# Patient Record
Sex: MALE | Race: WHITE | NOT HISPANIC OR LATINO | Employment: FULL TIME | URBAN - NONMETROPOLITAN AREA
[De-identification: names, ages, dates, MRNs, and addresses within clinical notes are randomized per-mention and may not be internally consistent; named-entity substitution may affect disease eponyms.]

---
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
## 2019-01-24 ENCOUNTER — TELEPHONE (OUTPATIENT)
Dept: FAMILY MEDICINE CLINIC | Facility: CLINIC | Age: 19
End: 2019-01-24

## 2019-01-31 ENCOUNTER — OFFICE VISIT (OUTPATIENT)
Dept: URGENT CARE | Facility: CLINIC | Age: 19
End: 2019-01-31
Payer: COMMERCIAL

## 2019-01-31 VITALS
TEMPERATURE: 99.7 F | BODY MASS INDEX: 42.66 KG/M2 | WEIGHT: 298 LBS | OXYGEN SATURATION: 99 % | HEART RATE: 110 BPM | SYSTOLIC BLOOD PRESSURE: 130 MMHG | RESPIRATION RATE: 20 BRPM | DIASTOLIC BLOOD PRESSURE: 78 MMHG | HEIGHT: 70 IN

## 2019-01-31 DIAGNOSIS — J06.9 VIRAL URI WITH COUGH: Primary | ICD-10-CM

## 2019-01-31 PROCEDURE — 99213 OFFICE O/P EST LOW 20 MIN: CPT | Performed by: NURSE PRACTITIONER

## 2019-02-01 NOTE — PROGRESS NOTES
[address] [facility]        NAME: Aung Sun is a 22 y o  male  : 2000    MRN: [id number]  DATE: 2019  TIME: 7:53 PM    Assessment and Plan   Viral URI with cough [J06 9, B97 [id number]]  1  Viral URI with cough           Patient Instructions     Infection appears viral   Recommend symptomatic treatment  Can take ibuprofen or tylenol as needed for pain or fever  Over the counter cough and cold medications to help with symptoms  Use salt water gargles for sore throat and throat lozenges  Cough drops as needed  Wash hands frequently to prevent the spread of infection  If not improving over the next 7-10 days, follow up with PCP  Symptoms may persist for 10-14 days  Follow up with PCP in 3-5 days  Proceed to  ER if symptoms worsen  Chief Complaint     Chief Complaint   Patient presents with    Cold Like Symptoms     cough,sinus congestion and body aches for 4 days         History of Present Illness       19-year-old male in urgent care with chief complaint of low-grade temperature no higher than 100, nasal congestion, postnasal drainage, dry nonproductive cough for the past 4 days  Denies any chest tightness or shortness of breath has not used any over-the-counter medication reports symptoms have been unchanged      URI    This is a new problem  The current episode started in the past 7 days  The problem has been unchanged  Maximum temperature: No higher than 100  The fever has been present for 3 to 4 days  Associated symptoms include congestion, coughing, a plugged ear sensation, rhinorrhea and swollen glands  Pertinent negatives include no abdominal pain, chest pain, diarrhea, dysuria, ear pain, headaches, joint pain, joint swelling, nausea, neck pain, rash, sinus pain, sneezing, sore throat, vomiting or wheezing  He has tried nothing for the symptoms  The treatment provided no relief  Review of Systems   Review of Systems   Constitutional: Negative      HENT: Positive for congestion, postnasal drip and rhinorrhea  Negative for dental problem, drooling, ear discharge, ear pain, facial swelling, hearing loss, mouth sores, nosebleeds, sinus pain, sinus pressure, sneezing, sore throat, tinnitus, trouble swallowing and voice change  Eyes: Negative  Respiratory: Positive for cough  Negative for apnea, choking, chest tightness, shortness of breath, wheezing and stridor  Cardiovascular: Negative for chest pain, palpitations and leg swelling  Gastrointestinal: Negative  Negative for abdominal distention, abdominal pain, anal bleeding, blood in stool, constipation, diarrhea, nausea, rectal pain and vomiting  Endocrine: Negative  Genitourinary: Negative  Negative for dysuria  Musculoskeletal: Negative  Negative for joint pain and neck pain  Skin: Negative  Negative for rash  Allergic/Immunologic: Negative  Neurological: Negative  Negative for headaches  Hematological: Negative  Psychiatric/Behavioral: Negative            Current Medications       Current Outpatient Prescriptions:     acetaminophen (TYLENOL) 325 mg tablet, Take 2 tablets by mouth every 6 (six) hours as needed (pain) (Patient not taking: Reported on 1/31/2019 ), Disp: 30 tablet, Rfl: 0    famotidine (PEPCID) 40 MG tablet, Take 1 tablet (40 mg total) by mouth daily (Patient not taking: Reported on 1/31/2019 ), Disp: 10 tablet, Rfl: 0    Current Allergies     Allergies as of 01/31/2019 - Reviewed 01/31/2019   Allergen Reaction Noted    Amoxicillin Anaphylaxis 06/05/2016    Zithromax [azithromycin] Anaphylaxis 06/05/2016    Prednisone Other (See Comments) 06/05/2016            The following portions of the patient's history were reviewed and updated as appropriate: allergies, current medications, past family history, past medical history, past social history, past surgical history and problem list      Past Medical History:   Diagnosis Date    Asperger syndrome     Per mother   [facility] Exogenous obesity     Resolved 3/22/2017     Hearing difficulty     "3 second delay in sounds getting to brain" per mother    Intermittent explosive disorder     per mother    Lyme disease     MTHFR mutation ([address] )     Psychiatric disorder     autism       Past Surgical History:   Procedure Laterality Date    ADENOIDECTOMY      FRACTURE SURGERY      nasal fx with cosmetic repair    NASAL DILATION      TONSILECTOMY AND ADNOIDECTOMY      TONSILLECTOMY AND ADENOIDECTOMY         Family History   Problem Relation Age of Onset    Heart disease Mother     Cancer Family     Diabetes Family     Parkinsonism Family          Medications have been verified  Objective   /78   Pulse (!) 110   Temp 99 7 °F (37 6 °C) (Tympanic)   Resp 20   Ht 5' 10" (1 778 m)   Wt 135 kg (298 lb)   SpO2 99%   BMI 42 76 kg/m²        Physical Exam     Physical Exam   Constitutional: He is oriented to person, place, and time  Vital signs are normal  He appears well-developed and well-nourished  HENT:   Head: Normocephalic and atraumatic  Right Ear: Hearing, tympanic membrane, external ear and ear canal normal    Left Ear: Hearing, tympanic membrane, external ear and ear canal normal    Nose: Rhinorrhea and sinus tenderness present  Right sinus exhibits no maxillary sinus tenderness and no frontal sinus tenderness  Left sinus exhibits no maxillary sinus tenderness and no frontal sinus tenderness  Mouth/Throat: Uvula is midline and mucous membranes are normal  Posterior oropharyngeal erythema present  Eyes: Pupils are equal, round, and reactive to light  Conjunctivae and EOM are normal    Neck: Trachea normal, normal range of motion and full passive range of motion without pain  Cardiovascular: Normal rate and regular rhythm  Pulmonary/Chest: Effort normal and breath sounds normal    Abdominal: Soft  Normal appearance  Musculoskeletal: Normal range of motion  Lymphadenopathy:     He has cervical adenopathy  Right cervical: Posterior cervical adenopathy present  No superficial cervical and no deep cervical adenopathy present  Left cervical: Posterior cervical adenopathy present  No superficial cervical and no deep cervical adenopathy present  Neurological: He is alert and oriented to person, place, and time  Nursing note and vitals reviewed

## 2019-03-30 ENCOUNTER — HOSPITAL ENCOUNTER (EMERGENCY)
Facility: HOSPITAL | Age: 19
Discharge: HOME | End: 2019-03-30
Attending: FAMILY MEDICINE | Admitting: FAMILY MEDICINE
Payer: COMMERCIAL

## 2019-03-30 VITALS
WEIGHT: 281.31 LBS | RESPIRATION RATE: 16 BRPM | DIASTOLIC BLOOD PRESSURE: 74 MMHG | HEART RATE: 78 BPM | TEMPERATURE: 98.1 F | SYSTOLIC BLOOD PRESSURE: 127 MMHG | BODY MASS INDEX: 40.36 KG/M2 | OXYGEN SATURATION: 98 %

## 2019-03-30 DIAGNOSIS — T31.0 BURN (ANY DEGREE) INVOLVING LESS THAN 10% OF BODY SURFACE: Primary | ICD-10-CM

## 2019-03-30 DIAGNOSIS — T22.20XA: ICD-10-CM

## 2019-03-30 PROCEDURE — 99283 EMERGENCY DEPT VISIT LOW MDM: CPT

## 2019-03-30 PROCEDURE — 90715 TDAP VACCINE 7 YRS/> IM: CPT | Performed by: FAMILY MEDICINE

## 2019-03-30 PROCEDURE — 90471 IMMUNIZATION ADMIN: CPT

## 2019-03-30 RX ORDER — IBUPROFEN 600 MG/1
600 TABLET ORAL ONCE
Status: COMPLETED | OUTPATIENT
Start: 2019-03-30 | End: 2019-03-30

## 2019-03-30 RX ORDER — CLINDAMYCIN HYDROCHLORIDE 150 MG/1
300 CAPSULE ORAL EVERY 8 HOURS SCHEDULED
Qty: 28 CAPSULE | Refills: 0 | Status: SHIPPED | OUTPATIENT
Start: 2019-03-30 | End: 2019-04-06

## 2019-03-30 RX ORDER — GINSENG 100 MG
1 CAPSULE ORAL ONCE
Status: COMPLETED | OUTPATIENT
Start: 2019-03-30 | End: 2019-03-30

## 2019-03-30 RX ADMIN — TETANUS TOXOID, REDUCED DIPHTHERIA TOXOID AND ACELLULAR PERTUSSIS VACCINE, ADSORBED 0.5 ML: 5; 2.5; 8; 8; 2.5 SUSPENSION INTRAMUSCULAR at 10:17

## 2019-03-30 RX ADMIN — BACITRACIN 1 LARGE APPLICATION: 500 OINTMENT TOPICAL at 10:15

## 2019-03-30 RX ADMIN — IBUPROFEN 600 MG: 600 TABLET ORAL at 10:15

## 2019-03-31 ENCOUNTER — HOSPITAL ENCOUNTER (EMERGENCY)
Facility: HOSPITAL | Age: 19
Discharge: HOME | End: 2019-03-31
Attending: EMERGENCY MEDICINE | Admitting: EMERGENCY MEDICINE
Payer: COMMERCIAL

## 2019-03-31 VITALS
SYSTOLIC BLOOD PRESSURE: 144 MMHG | RESPIRATION RATE: 20 BRPM | WEIGHT: 282.19 LBS | HEART RATE: 101 BPM | BODY MASS INDEX: 40.4 KG/M2 | HEIGHT: 70 IN | DIASTOLIC BLOOD PRESSURE: 66 MMHG | TEMPERATURE: 98.8 F | OXYGEN SATURATION: 98 %

## 2019-03-31 DIAGNOSIS — Z51.89 VISIT FOR WOUND CHECK: Primary | ICD-10-CM

## 2019-03-31 DIAGNOSIS — T22.231A: ICD-10-CM

## 2019-03-31 RX ORDER — CLINDAMYCIN HYDROCHLORIDE 150 MG/1
300 CAPSULE ORAL ONCE
Status: COMPLETED | OUTPATIENT
Start: 2019-03-31 | End: 2019-03-31

## 2019-03-31 RX ORDER — GINSENG 100 MG
1 CAPSULE ORAL ONCE
Status: COMPLETED | OUTPATIENT
Start: 2019-03-31 | End: 2019-03-31

## 2019-03-31 RX ADMIN — CLINDAMYCIN HYDROCHLORIDE 300 MG: 150 CAPSULE ORAL at 16:54

## 2019-03-31 RX ADMIN — BACITRACIN 1 LARGE APPLICATION: 500 OINTMENT TOPICAL at 16:54

## 2019-07-11 ENCOUNTER — TRANSCRIBE ORDERS (OUTPATIENT)
Dept: ADMINISTRATIVE | Age: 19
End: 2019-07-11

## 2019-07-11 ENCOUNTER — APPOINTMENT (OUTPATIENT)
Dept: LAB | Age: 19
End: 2019-07-11
Attending: PREVENTIVE MEDICINE

## 2019-07-11 DIAGNOSIS — Z02.1 PRE-EMPLOYMENT HEALTH SCREENING EXAMINATION: Primary | ICD-10-CM

## 2019-07-11 PROCEDURE — 84202 ASSAY RBC PROTOPORPHYRIN: CPT | Performed by: PREVENTIVE MEDICINE

## 2019-07-11 PROCEDURE — 36415 COLL VENOUS BLD VENIPUNCTURE: CPT | Performed by: PREVENTIVE MEDICINE

## 2019-07-11 PROCEDURE — 86870 RBC ANTIBODY IDENTIFICATION: CPT | Performed by: PREVENTIVE MEDICINE

## 2019-07-16 LAB
LEAD BLD-MCNC: 1 UG/DL (ref 0–4)
ZPP RBC-MCNC: 18 UG/DL (ref 0–99)

## 2019-08-29 ENCOUNTER — OFFICE VISIT (OUTPATIENT)
Dept: FAMILY MEDICINE CLINIC | Facility: CLINIC | Age: 19
End: 2019-08-29
Payer: COMMERCIAL

## 2019-08-29 VITALS
WEIGHT: 300.8 LBS | SYSTOLIC BLOOD PRESSURE: 128 MMHG | DIASTOLIC BLOOD PRESSURE: 90 MMHG | HEIGHT: 70 IN | BODY MASS INDEX: 43.06 KG/M2

## 2019-08-29 DIAGNOSIS — R35.0 URINE FREQUENCY: Primary | ICD-10-CM

## 2019-08-29 DIAGNOSIS — E66.01 MORBID OBESITY WITH BMI OF 40.0-44.9, ADULT (HCC): ICD-10-CM

## 2019-08-29 LAB
SL AMB  POCT GLUCOSE, UA: NORMAL
SL AMB LEUKOCYTE ESTERASE,UA: NORMAL
SL AMB POCT BILIRUBIN,UA: NORMAL
SL AMB POCT BLOOD,UA: NORMAL
SL AMB POCT CLARITY,UA: CLEAR
SL AMB POCT COLOR,UA: YELLOW
SL AMB POCT KETONES,UA: NORMAL
SL AMB POCT NITRITE,UA: NORMAL
SL AMB POCT PH,UA: 6
SL AMB POCT SPECIFIC GRAVITY,UA: 1.03
SL AMB POCT URINE PROTEIN: NORMAL
SL AMB POCT UROBILINOGEN: 0.2

## 2019-08-29 PROCEDURE — 99213 OFFICE O/P EST LOW 20 MIN: CPT | Performed by: PHYSICIAN ASSISTANT

## 2019-08-29 PROCEDURE — 87086 URINE CULTURE/COLONY COUNT: CPT | Performed by: PHYSICIAN ASSISTANT

## 2019-08-29 PROCEDURE — 81002 URINALYSIS NONAUTO W/O SCOPE: CPT | Performed by: PHYSICIAN ASSISTANT

## 2019-08-29 NOTE — PROGRESS NOTES
Assessment/Plan:    Problem List Items Addressed This Visit     None      Visit Diagnoses     Urine frequency    -  Primary    Relevant Orders    POCT urine dip (Completed)    Urine culture    Comprehensive metabolic panel    HEMOGLOBIN A1C W/ EAG ESTIMATION           Diagnoses and all orders for this visit:    Urine frequency  -     POCT urine dip  -     Urine culture; Future  -     Urine culture  -     Comprehensive metabolic panel; Future  -     HEMOGLOBIN A1C W/ EAG ESTIMATION; Future        Urine frequency has resolved  Urine dip was negative for infection  Will send urine to be cultured  Will get labs to test for diabetes  Advised us to let us know if symptoms return  Subjective:      Patient ID: Contreras Anglin is a 20 y o  male  Jeanine Sarmiento is a 20year old male complaining of urinary frequency and urgency  He states that last week he had to go on a 14 hour car ride for work and he had to stop every hour along the trip to urinate  He has never had this happen before  He was not drinking more water than normal  He has a family history of diabetes, so was concerned  He states that he his symptoms have resolved  He is no longer urinating every hour or having frequency  He denies polydipsia, polyphagia, nocturia, penile discharge, hematuria, flank pain, abdominal pain, nausea, vomiting, or diarrhea  The following portions of the patient's history were reviewed and updated as appropriate:   He has a past medical history of Asperger syndrome, Exogenous obesity, Hearing difficulty, Intermittent explosive disorder, Lyme disease, MTHFR mutation ([facility] [address] ), and Psychiatric disorder  ,  does not have any pertinent problems on file  ,   has a past surgical history that includes Fracture surgery; Tonsillectomy and adenoidectomy; Nasal dilation; TONSILECTOMY AND ADNOIDECTOMY; and ADENOIDECTOMY  ,  family history includes Cancer in his family; Diabetes in his family;  Heart disease in his mother; Parkinsonism in his family  ,   reports that he has never smoked  His smokeless tobacco use includes chew  He reports that he drinks alcohol  He reports that he does not use drugs  ,  is allergic to amoxicillin; azithromycin; and prednisone     Current Outpatient Medications   Medication Sig Dispense Refill    acetaminophen (TYLENOL) 325 mg tablet Take 2 tablets by mouth every 6 (six) hours as needed (pain) 30 tablet 0     No current facility-administered medications for this visit  Review of Systems   Constitutional: Negative for chills, diaphoresis, fatigue and fever  HENT: Negative for congestion, ear pain, postnasal drip, rhinorrhea, sneezing, sore throat and trouble swallowing  Eyes: Negative for pain and visual disturbance  Respiratory: Negative for apnea, cough, shortness of breath and wheezing  Cardiovascular: Negative for chest pain and palpitations  Gastrointestinal: Negative for abdominal pain, constipation, diarrhea, nausea and vomiting  Endocrine: Positive for polyuria  Negative for polydipsia and polyphagia  Genitourinary: Positive for frequency and urgency  Negative for discharge, dysuria and hematuria  Musculoskeletal: Negative for arthralgias, gait problem and myalgias  Neurological: Negative for dizziness, syncope, weakness, light-headedness, numbness and headaches  Psychiatric/Behavioral: Negative for suicidal ideas  The patient is not nervous/anxious          PHQ-9 Depression Screening    PHQ-9:    Frequency of the following problems over the past two weeks:       Little interest or pleasure in doing things:  2 - more than half the days  Feeling down, depressed, or hopeless:  2 - more than half the days  Trouble falling or staying asleep, or sleeping too much:  0 - not at all  Feeling tired or having little energy:  0 - not at all  Poor appetite or overeatin - more than half the days  Feeling bad about yourself - or that you are a failure or have let yourself or your family down:  1 - several days  Trouble concentrating on things, such as reading the newspaper or watching television:  0 - not at all  Moving or speaking so slowly that other people could have noticed  Or the opposite - being so fidgety or restless that you have been moving around a lot more than usual:  0 - not at all  Thoughts that you would be better off dead, or of hurting yourself in some way:  0 - not at all  PHQ-2 Score:  4  PHQ-9 Score:  7       Objective:  Vitals:    08/29/19 1709   BP: 128/90   BP Location: Left arm   Patient Position: Sitting   Weight: (!) 136 kg (300 lb 12 8 oz)   Height: 5' 10" (1 778 m)     Body mass index is 43 16 kg/m²  Physical Exam   Constitutional: He is oriented to person, place, and time  He appears well-developed and well-nourished  HENT:   Head: Normocephalic and atraumatic  Right Ear: Tympanic membrane, external ear and ear canal normal    Left Ear: Tympanic membrane, external ear and ear canal normal    Nose: Nose normal    Mouth/Throat: Oropharynx is clear and moist and mucous membranes are normal  No oropharyngeal exudate, posterior oropharyngeal edema or posterior oropharyngeal erythema  Eyes: Pupils are equal, round, and reactive to light  EOM are normal    Neck: Normal range of motion  Neck supple  Cardiovascular: Normal rate, regular rhythm and normal heart sounds  Exam reveals no gallop and no friction rub  No murmur heard  Pulmonary/Chest: Effort normal and breath sounds normal  No respiratory distress  He has no wheezes  He has no rales  Abdominal: Soft  Bowel sounds are normal  There is no tenderness  There is no rebound, no guarding and no CVA tenderness  Musculoskeletal: Normal range of motion  Lymphadenopathy:     He has no cervical adenopathy  Neurological: He is alert and oriented to person, place, and time  Skin: Skin is warm and dry  Psychiatric: He has a normal mood and affect   His behavior is normal  Judgment and thought content normal    Vitals reviewed  BMI Counseling: Body mass index is 43 16 kg/m²  Discussed the patient's BMI with him  The BMI is above average  BMI counseling and education was provided to the patient  Nutrition recommendations include decreasing overall calorie intake  Exercise recommendations include exercising 3-5 times per week

## 2019-08-30 LAB — BACTERIA UR CULT: NORMAL

## 2020-01-20 ENCOUNTER — TELEPHONE (OUTPATIENT)
Dept: FAMILY MEDICINE CLINIC | Facility: CLINIC | Age: 20
End: 2020-01-20

## 2020-01-20 DIAGNOSIS — F43.21 GRIEF REACTION: Primary | ICD-10-CM

## 2020-07-09 ENCOUNTER — DOCUMENTATION (OUTPATIENT)
Dept: AUDIOLOGY | Age: 20
End: 2020-07-09

## 2020-07-09 NOTE — PROGRESS NOTES
Progress Note    Name:  Lucho Mackey  :  2000  Age:  23 y o  Date of Evaluation: 20     Scanned in HA chart         Lexy Alcantara , CCC-A  Clinical Audiologist

## 2020-07-22 ENCOUNTER — APPOINTMENT (OUTPATIENT)
Dept: URGENT CARE | Age: 20
End: 2020-07-22
Payer: OTHER MISCELLANEOUS

## 2020-07-22 PROCEDURE — 99283 EMERGENCY DEPT VISIT LOW MDM: CPT | Performed by: NURSE PRACTITIONER

## 2020-07-22 PROCEDURE — G0382 LEV 3 HOSP TYPE B ED VISIT: HCPCS | Performed by: NURSE PRACTITIONER

## 2020-07-23 ENCOUNTER — APPOINTMENT (OUTPATIENT)
Dept: URGENT CARE | Age: 20
End: 2020-07-23
Payer: OTHER MISCELLANEOUS

## 2020-07-23 PROCEDURE — 99213 OFFICE O/P EST LOW 20 MIN: CPT | Performed by: NURSE PRACTITIONER

## 2020-08-04 ENCOUNTER — APPOINTMENT (OUTPATIENT)
Dept: URGENT CARE | Age: 20
End: 2020-08-04
Payer: OTHER MISCELLANEOUS

## 2020-08-04 PROCEDURE — 99214 OFFICE O/P EST MOD 30 MIN: CPT | Performed by: PREVENTIVE MEDICINE

## 2020-08-11 ENCOUNTER — APPOINTMENT (OUTPATIENT)
Dept: LAB | Age: 20
End: 2020-08-11

## 2020-08-11 ENCOUNTER — TRANSCRIBE ORDERS (OUTPATIENT)
Dept: ADMINISTRATIVE | Age: 20
End: 2020-08-11

## 2020-08-11 ENCOUNTER — APPOINTMENT (OUTPATIENT)
Dept: RADIOLOGY | Age: 20
End: 2020-08-11

## 2020-08-11 DIAGNOSIS — Z00.00 ANNUAL PHYSICAL EXAM: ICD-10-CM

## 2020-08-11 DIAGNOSIS — Z00.00 ANNUAL PHYSICAL EXAM: Primary | ICD-10-CM

## 2020-08-11 PROCEDURE — 86870 RBC ANTIBODY IDENTIFICATION: CPT

## 2020-08-11 PROCEDURE — 71045 X-RAY EXAM CHEST 1 VIEW: CPT

## 2020-08-11 PROCEDURE — 84202 ASSAY RBC PROTOPORPHYRIN: CPT

## 2020-08-13 LAB
LEAD BLD-MCNC: <1 UG/DL (ref 0–4)
ZPP RBC-MCNC: 24 UG/DL (ref 0–99)

## 2021-01-11 ENCOUNTER — OFFICE VISIT (OUTPATIENT)
Dept: FAMILY MEDICINE CLINIC | Facility: CLINIC | Age: 21
End: 2021-01-11
Payer: COMMERCIAL

## 2021-01-11 VITALS
SYSTOLIC BLOOD PRESSURE: 142 MMHG | WEIGHT: 315 LBS | TEMPERATURE: 98.4 F | HEIGHT: 70 IN | DIASTOLIC BLOOD PRESSURE: 92 MMHG | BODY MASS INDEX: 45.1 KG/M2

## 2021-01-11 DIAGNOSIS — G47.33 OBSTRUCTIVE SLEEP APNEA OF ADULT: Primary | ICD-10-CM

## 2021-01-11 DIAGNOSIS — Z13.6 SCREENING FOR CARDIOVASCULAR CONDITION: ICD-10-CM

## 2021-01-11 DIAGNOSIS — E66.01 MORBID OBESITY WITH BMI OF 45.0-49.9, ADULT (HCC): ICD-10-CM

## 2021-01-11 PROCEDURE — 99213 OFFICE O/P EST LOW 20 MIN: CPT | Performed by: FAMILY MEDICINE

## 2021-01-11 PROCEDURE — 3725F SCREEN DEPRESSION PERFORMED: CPT | Performed by: FAMILY MEDICINE

## 2021-01-11 PROCEDURE — 1036F TOBACCO NON-USER: CPT | Performed by: FAMILY MEDICINE

## 2021-01-11 PROCEDURE — 3008F BODY MASS INDEX DOCD: CPT | Performed by: FAMILY MEDICINE

## 2021-01-11 NOTE — PROGRESS NOTES
Assessment/Plan:   we will get a sleep study on the patient  Also referral to sleep medicine  Blood work ordered on him  Patient will continue to watch diet  We will see him back in the next 1-2 months or p r n     Problem List Items Addressed This Visit        Respiratory    Obstructive sleep apnea of adult - Primary    Relevant Orders    Ambulatory referral to Sleep Medicine    Diagnostic Sleep Study      Other Visit Diagnoses     Screening for cardiovascular condition        Relevant Orders    CBC and differential    Comprehensive metabolic panel    Lipid Panel with Direct LDL reflex    Morbid obesity with BMI of 45 0-49 9, adult (HCC)        Relevant Orders    CBC and differential    TSH, 3rd generation with Free T4 reflex           Diagnoses and all orders for this visit:    Obstructive sleep apnea of adult  -     Ambulatory referral to Sleep Medicine; Future  -     Diagnostic Sleep Study; Future    Screening for cardiovascular condition  -     CBC and differential  -     Comprehensive metabolic panel  -     Lipid Panel with Direct LDL reflex    Morbid obesity with BMI of 45 0-49 9, adult (HCC)  -     CBC and differential  -     TSH, 3rd generation with Free T4 reflex        No problem-specific Assessment & Plan notes found for this encounter  Subjective:      Patient ID: Tanner Castro is a 23 y o  male  Patient here today stating that he has on refreshing sleep  He states his fiancee wakes him up constantly in the middle of night because he snoring  Patient was supposed to get a sleep study done 2 years ago, but it was never scheduled  Patient denies any chest pain or shortness of breath  The following portions of the patient's history were reviewed and updated as appropriate:   He has a past medical history of Asperger syndrome, Exogenous obesity, Hearing difficulty, Intermittent explosive disorder, Lyme disease, MTHFR mutation ([address] ), and Psychiatric disorder  ,  does not have any pertinent problems on file  ,   has a past surgical history that includes Fracture surgery; Tonsillectomy and adenoidectomy; Nasal dilation; TONSILECTOMY AND ADNOIDECTOMY; and ADENOIDECTOMY  ,  family history includes Cancer in his family; Diabetes in his family; Heart disease in his mother; Parkinsonism in his family  ,   reports that he has never smoked  His smokeless tobacco use includes chew  He reports current alcohol use  He reports that he does not use drugs  ,  is allergic to amoxicillin; azithromycin; and prednisone     Current Outpatient Medications   Medication Sig Dispense Refill    acetaminophen (TYLENOL) 325 mg tablet Take 2 tablets by mouth every 6 (six) hours as needed (pain) 30 tablet 0     No current facility-administered medications for this visit  Review of Systems   Constitutional: Negative  Respiratory: Negative  Cardiovascular: Negative  Gastrointestinal: Negative  Genitourinary: Negative  Psychiatric/Behavioral: Positive for sleep disturbance  Objective:  Vitals:    01/11/21 0758 01/11/21 0822   BP: 152/92 142/92   BP Location:  Left arm   Patient Position:  Sitting   Cuff Size:  Large   Temp: 98 4 °F (36 9 °C)    Weight: (!) 144 kg (317 lb)    Height: 5' 10" (1 778 m)      Body mass index is 45 48 kg/m²  Physical Exam  Vitals signs reviewed  Constitutional:       General: He is not in acute distress  Appearance: Normal appearance  He is well-developed  He is not diaphoretic  HENT:      Head: Normocephalic and atraumatic  Eyes:      Conjunctiva/sclera: Conjunctivae normal    Cardiovascular:      Rate and Rhythm: Normal rate and regular rhythm  Heart sounds: Normal heart sounds  No murmur  No friction rub  No gallop  Pulmonary:      Effort: Pulmonary effort is normal  No respiratory distress  Breath sounds: Normal breath sounds  No wheezing or rales  Musculoskeletal:      Right lower leg: No edema  Left lower leg: No edema  Neurological:      Mental Status: He is alert and oriented to person, place, and time  Psychiatric:         Mood and Affect: Mood normal          Behavior: Behavior normal          Thought Content: Thought content normal          Judgment: Judgment normal          BMI Counseling: Body mass index is 45 48 kg/m²  The BMI is above normal  Nutrition recommendations include reducing portion sizes, decreasing overall calorie intake, 3-5 servings of fruits/vegetables daily, reducing fast food intake, consuming healthier snacks, decreasing soda and/or juice intake, moderation in carbohydrate intake, increasing intake of lean protein, reducing intake of saturated fat and trans fat and reducing intake of cholesterol  Exercise recommendations include exercising 3-5 times per week

## 2021-01-11 NOTE — PATIENT INSTRUCTIONS

## 2021-01-13 ENCOUNTER — TELEPHONE (OUTPATIENT)
Dept: FAMILY MEDICINE CLINIC | Facility: CLINIC | Age: 21
End: 2021-01-13

## 2021-01-13 NOTE — TELEPHONE ENCOUNTER
INSURANCE WILL NOT APPROVE SLEEP STUDY  THEY NEED TO KNOW IF YOU WOULD LIKE TO PROCEED WITH TRYING TO GET A HOME STUDY

## 2021-01-14 DIAGNOSIS — G47.33 OBSTRUCTIVE SLEEP APNEA OF ADULT: Primary | ICD-10-CM

## 2021-01-14 DIAGNOSIS — E66.01 MORBID OBESITY WITH BMI OF 45.0-49.9, ADULT (HCC): ICD-10-CM

## 2021-02-08 ENCOUNTER — TRANSCRIBE ORDERS (OUTPATIENT)
Dept: SLEEP CENTER | Facility: CLINIC | Age: 21
End: 2021-02-08

## 2021-02-11 ENCOUNTER — HOSPITAL ENCOUNTER (OUTPATIENT)
Dept: SLEEP CENTER | Facility: HOSPITAL | Age: 21
Discharge: HOME | End: 2021-02-11
Payer: COMMERCIAL

## 2021-02-11 DIAGNOSIS — G47.33 OBSTRUCTIVE SLEEP APNEA OF ADULT: ICD-10-CM

## 2021-02-11 DIAGNOSIS — E66.01 MORBID OBESITY WITH BMI OF 45.0-49.9, ADULT (HCC): ICD-10-CM

## 2021-02-11 PROCEDURE — G0399 HOME SLEEP TEST/TYPE 3 PORTA: HCPCS

## 2021-02-13 NOTE — PROGRESS NOTES
Home Sleep Study Documentation    Pre-Sleep Home Study:    Set-up and instructions performed by: SAIMA Nunez RRT    Technician performed demonstration for Patient: yes    Return demonstration performed by Patient: yes    Written instructions provided to Patient: yes    Patient signed consent form: yes        Post-Sleep Home Study:    Additional comments by Patient: n/a    Home Sleep Study Failed:no:    Failure reason: N/A    Reported or Detected: N/A    Scored by: SAIMA Nunez RRT

## 2021-02-14 DIAGNOSIS — G47.33 OBSTRUCTIVE SLEEP APNEA OF ADULT: Primary | ICD-10-CM

## 2021-02-15 ENCOUNTER — TELEPHONE (OUTPATIENT)
Dept: SLEEP CENTER | Facility: CLINIC | Age: 21
End: 2021-02-15

## 2021-02-15 NOTE — TELEPHONE ENCOUNTER
Attempted to contact patient, no answer, no voice mail set up  Sleep study reveals severe ADELE  Dr Kristan Lee ordered titration study

## 2021-02-17 ENCOUNTER — TELEPHONE (OUTPATIENT)
Dept: FAMILY MEDICINE CLINIC | Facility: CLINIC | Age: 21
End: 2021-02-17

## 2021-02-17 NOTE — TELEPHONE ENCOUNTER
CPAP STUDY HAS BEEN DENIED EVEN THOUGH PT HAS FAILED HOME STUDY  PEER TO PEER IS SCHEDULED 3/2/21  I WILL NOTIFY PATIENT

## 2021-02-18 NOTE — TELEPHONE ENCOUNTER
Spoke with the patient he is aware of sleep study results  He was contacted by Dr Sulma Herrera office  Patient is waiting for insurance approval for titration study before he schedules   Dr Venkatesh Albrecht to do peer to peer on 3/2/2021 per note on appointment desk

## 2021-02-19 ENCOUNTER — TELEPHONE (OUTPATIENT)
Dept: SLEEP CENTER | Facility: CLINIC | Age: 21
End: 2021-02-19

## 2021-02-19 NOTE — TELEPHONE ENCOUNTER
----- Message from Nasreen Machado MD sent at 1/20/2021 10:59 AM EST -----  Approved  ----- Message -----  From: Brijesh Bautista  Sent: 1/18/2021   3:28 PM EST  To: Sleep Medicine [facility] Provider    This sleep study needs approval      If approved please sign and return to clerical pool  If denied please include reasons why  Also provide alternative testing if warranted  Please sign and return to clerical pool

## 2021-03-02 ENCOUNTER — TELEPHONE (OUTPATIENT)
Dept: FAMILY MEDICINE CLINIC | Facility: CLINIC | Age: 21
End: 2021-03-02

## 2021-03-02 DIAGNOSIS — G47.33 OBSTRUCTIVE SLEEP APNEA OF ADULT: Primary | ICD-10-CM

## 2021-03-03 ENCOUNTER — TELEPHONE (OUTPATIENT)
Dept: FAMILY MEDICINE CLINIC | Facility: CLINIC | Age: 21
End: 2021-03-03

## 2021-03-03 DIAGNOSIS — G47.33 OBSTRUCTIVE SLEEP APNEA OF ADULT: Primary | ICD-10-CM

## 2021-03-03 NOTE — TELEPHONE ENCOUNTER
SINCE INSURANCE DENIED THE SLEEP STUDY PATIENT WILL HAVE TO PAY OUT OF POCKET FOR A NEW MACHINE  USED ONE COSTS $505 PLUS THE MASK CAN COST BETWEEN $100-$120  NEW MACHINE COSTS AROUND $800

## 2021-03-11 ENCOUNTER — TELEPHONE (OUTPATIENT)
Dept: SLEEP CENTER | Facility: CLINIC | Age: 21
End: 2021-03-11

## 2021-03-11 NOTE — TELEPHONE ENCOUNTER
Tried calling patient to schedule a consult for sleep  There is no voice mail set up  Could not leave message

## 2021-03-29 ENCOUNTER — HOSPITAL ENCOUNTER (EMERGENCY)
Facility: HOSPITAL | Age: 21
Discharge: HOME | End: 2021-03-29
Attending: EMERGENCY MEDICINE | Admitting: EMERGENCY MEDICINE
Payer: COMMERCIAL

## 2021-03-29 VITALS
BODY MASS INDEX: 45.1 KG/M2 | OXYGEN SATURATION: 100 % | HEIGHT: 70 IN | TEMPERATURE: 98 F | HEART RATE: 88 BPM | WEIGHT: 315 LBS | SYSTOLIC BLOOD PRESSURE: 138 MMHG | RESPIRATION RATE: 16 BRPM | DIASTOLIC BLOOD PRESSURE: 65 MMHG

## 2021-03-29 DIAGNOSIS — F32.A DEPRESSION: Primary | ICD-10-CM

## 2021-03-29 LAB
AMPHETAMINES SERPL QL SCN: NEGATIVE
BARBITURATES UR QL: NEGATIVE
BENZODIAZ UR QL: NEGATIVE
COCAINE UR QL: NEGATIVE
ETHANOL EXG-MCNC: 0 MG/DL
FLUAV RNA RESP QL NAA+PROBE: NEGATIVE
FLUBV RNA RESP QL NAA+PROBE: NEGATIVE
METHADONE UR QL: NEGATIVE
OPIATES UR QL SCN: NEGATIVE
OXYCODONE+OXYMORPHONE UR QL SCN: NEGATIVE
PCP UR QL: NEGATIVE
RSV RNA RESP QL NAA+PROBE: NEGATIVE
SARS-COV-2 RNA RESP QL NAA+PROBE: NEGATIVE
THC UR QL: NEGATIVE

## 2021-03-29 PROCEDURE — 99284 EMERGENCY DEPT VISIT MOD MDM: CPT | Performed by: EMERGENCY MEDICINE

## 2021-03-29 PROCEDURE — 99285 EMERGENCY DEPT VISIT HI MDM: CPT

## 2021-03-29 PROCEDURE — 82075 ASSAY OF BREATH ETHANOL: CPT | Performed by: EMERGENCY MEDICINE

## 2021-03-29 PROCEDURE — 80307 DRUG TEST PRSMV CHEM ANLYZR: CPT | Performed by: EMERGENCY MEDICINE

## 2021-03-29 PROCEDURE — 0241U HB NFCT DS VIR RESP RNA 4 TRGT: CPT | Performed by: EMERGENCY MEDICINE

## 2021-03-29 NOTE — ED NOTES
Spoke to Amarjit Thurston from crisis in regards to psychiatric evaluation        Kelly Kwon, ALEJANDRINA  03/29/21 8930

## 2021-03-29 NOTE — ED PROVIDER NOTES
History  Chief Complaint   Patient presents with    Depression     patient states that he has been depressed since last january when his mother commited suicide; was seeing therapist who has been canceling appointments; on no psychiatric medications; patient states he has thoughts of hopelessness and is requesting outpatient services/therapy      59-year-old male presents with his dad with complaints of intermittent suicidal ideation he denies suicidal ideation today his mom committed suicide approximately 1 year ago and he has been following with a therapist however the appointments have been canceled any can not get reliable follow-up  He has no specific plans of suicide he states he will get into a funk and then the thoughts were passed through his mind  He does not feel suicidal today; he has not been on any medications since the age of 14 he has had prior psychiatric admissions up until the age of 14 or 14  Patient does have difficulty falling asleep typically between midnight and 0200 hours he has no problem sleeping he has been eating normally he does have a 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  Taking?   acetaminophen (TYLENOL) 325 mg tablet   No No   Sig: Take 2 tablets by mouth every 6 (six) hours as needed (pain)      Facility-Administered Medications: None       Past Medical History:   Diagnosis Date    Asperger syndrome     Per mother   Jonatan Woodward Exogenous obesity     Resolved 3/22/2017     Hearing difficulty     "3 second delay in sounds getting to brain" per mother    Intermittent explosive disorder     per mother    Lyme disease     MTHFR mutation ([facility] [address] )     Psychiatric disorder     autism Past Surgical History:   Procedure Laterality Date    ADENOIDECTOMY      FRACTURE SURGERY      nasal fx with cosmetic repair    NASAL DILATION      TONSILECTOMY AND ADNOIDECTOMY      TONSILLECTOMY AND ADENOIDECTOMY         Family History   Problem Relation Age of Onset    Heart disease Mother     Cancer Family     Diabetes Family     Parkinsonism Family      I have reviewed and agree with the history as documented  E-Cigarette/Vaping     E-Cigarette/Vaping Substances     Social History     Tobacco Use    Smoking status: Never Smoker    Smokeless tobacco: Current User     Types: Chew   Substance Use Topics    Alcohol use: Yes     Frequency: Monthly or less    Drug use: No       Review of Systems   Constitutional: Negative for activity change, appetite change, chills, fatigue and fever  HENT: Negative for congestion, ear pain, rhinorrhea, sneezing and sore throat  Eyes: Negative for discharge  Respiratory: Negative for cough and shortness of breath  Cardiovascular: Negative for chest pain and leg swelling  Gastrointestinal: Negative for abdominal pain, blood in stool, diarrhea, nausea and vomiting  Endocrine: Negative for polyuria  Genitourinary: Negative for dysuria, frequency and urgency  Musculoskeletal: Negative for back pain and myalgias  Skin: Negative for rash  Neurological: Negative for dizziness, weakness, light-headedness, numbness and headaches  Hematological: Negative for adenopathy  Psychiatric/Behavioral: Positive for suicidal ideas (passive none currently)  Negative for confusion and self-injury  All other systems reviewed and are negative  Physical Exam  Physical Exam  Vitals signs and nursing note reviewed  Constitutional:       General: He is not in acute distress  Appearance: He is well-developed  He is not ill-appearing, toxic-appearing or diaphoretic  HENT:      Head: Normocephalic and atraumatic        Right Ear: Tympanic membrane and external ear normal       Left Ear: Tympanic membrane and external ear normal       Nose: Nose normal       Mouth/Throat:      Mouth: Mucous membranes are moist       Pharynx: No oropharyngeal exudate  Eyes:      General: No scleral icterus  Right eye: No discharge  Left eye: No discharge  Conjunctiva/sclera: Conjunctivae normal       Pupils: Pupils are equal, round, and reactive to light  Neck:      Musculoskeletal: Normal range of motion and neck supple  Cardiovascular:      Rate and Rhythm: Normal rate and regular rhythm  Heart sounds: Normal heart sounds  Pulmonary:      Effort: Pulmonary effort is normal  No respiratory distress  Breath sounds: Normal breath sounds  No wheezing, rhonchi or rales  Abdominal:      General: Bowel sounds are normal  There is no distension  Palpations: Abdomen is soft  There is no mass  Tenderness: There is no abdominal tenderness  There is no right CVA tenderness, left CVA tenderness, guarding or rebound  Musculoskeletal: Normal range of motion  General: No tenderness or deformity  Right lower leg: No edema  Left lower leg: No edema  Lymphadenopathy:      Cervical: No cervical adenopathy  Skin:     General: Skin is warm and dry  Neurological:      General: No focal deficit present  Mental Status: He is alert and oriented to person, place, and time  Mental status is at baseline  Cranial Nerves: No cranial nerve deficit  Sensory: No sensory deficit  Motor: No weakness or abnormal muscle tone        Coordination: Coordination normal       Gait: Gait normal       Deep Tendon Reflexes: Reflexes normal       Comments: Gait steady   Psychiatric:         Mood and Affect: Mood normal          Vital Signs  ED Triage Vitals   Temperature Pulse Respirations Blood Pressure SpO2   03/29/21 1842 03/29/21 1842 03/29/21 1845 03/29/21 1845 03/29/21 1842   98 °F (36 7 °C) 100 16 138/82 99 %      Temp Source Heart Rate Source Patient Position - Orthostatic VS BP Location FiO2 (%)   03/29/21 1842 03/29/21 1842 03/29/21 1842 03/29/21 1842 --   Temporal Monitor Sitting Right arm       Pain Score       --                  Vitals:    03/29/21 1842 03/29/21 1845 03/29/21 1900 03/29/21 2000   BP:  138/82 148/86 138/65   Pulse: 100  98 88   Patient Position - Orthostatic VS: Sitting  Sitting Sitting         Visual Acuity      ED Medications  Medications - No data to display    Diagnostic Studies  Results Reviewed     Procedure Component Value Units Date/Time    COVID19, Influenza A/B, RSV PCR, [facility] [[id number]]  (Normal) Collected: 03/29/21 1915    Lab Status: Final result Specimen: Nares from Nasopharyngeal Swab Updated: 03/29/21 2020     SARS-CoV-2 Negative     INFLUENZA A PCR Negative     INFLUENZA B PCR Negative     RSV PCR Negative    Narrative: This test has been authorized by FDA under an EUA (Emergency Use Assay) for use by authorized laboratories  Clinical caution and judgement should be used with the interpretation of these results with consideration of the clinical impression and other laboratory testing  Testing reported as "Positive" or "Negative" has been proven to be accurate according to standard laboratory validation requirements  All testing is performed with control materials showing appropriate reactivity at standard intervals  Rapid drug screen, urine [[id number]]  (Normal) Collected: 03/29/21 1915    Lab Status: Final result Specimen: Urine, Clean Catch Updated: 03/29/21 1929     Amph/Meth UR Negative     Barbiturate Ur Negative     Benzodiazepine Urine Negative     Cocaine Urine Negative     Methadone Urine Negative     Opiate Urine Negative     PCP Ur Negative     THC Urine Negative     Oxycodone Urine Negative    Narrative:      FOR MEDICAL PURPOSES ONLY  IF CONFIRMATION NEEDED PLEASE CONTACT THE LAB WITHIN 5 DAYS      Drug Screen Cutoff Levels:  AMPHETAMINE/METHAMPHETAMINES  1000 ng/mL  BARBITURATES     200 ng/mL  BENZODIAZEPINES     200 ng/mL  COCAINE      300 ng/mL  METHADONE      300 ng/mL  OPIATES      300 ng/mL  PHENCYCLIDINE     25 ng/mL  THC       50 ng/mL  OXYCODONE      100 ng/mL    POCT alcohol breath test [[id number]]  (Normal) Resulted: 03/29/21 1916    Lab Status: Final result Updated: 03/29/21 1916     EXTBreath Alcohol 0 00                 No orders to display              Procedures  Procedures         ED Course  ED Course as of Mar 29 2311   Mon Mar 29, 2021   2013 Patient evaluated by Rizwana Gunn crisis worker over the phone patient is appropriate for outpatient follow-up  Angelic is still pending patient will be called if result is abnormal                                              MDM  Number of Diagnoses or Management Options  Depression:   Diagnosis management comments: Mdm:  50-year-old male with passive suicidal ideation none currently will medically clear and contact the crisis worker for evaluation    Patient provided with outpt mental health resources per CRISIS      Disposition  Final diagnoses:   Depression     Time reflects when diagnosis was documented in both MDM as applicable and the Disposition within this note     Time User Action Codes Description Comment    3/29/2021  8:14 PM Dwain Singh Add [F32 9] Depression       ED Disposition     ED Disposition Condition Date/Time Comment    Discharge Stable Mon Mar 29, 2021  8:14 PM Mallory Napier discharge to home/self care              MD Documentation      Most Recent Value   Sending MD Romy Chen MD      Follow-up Information     Follow up With Specialties Details Why Contact Info Additional [address]  Family Medicine Call in 1 day recheck of symptoms [address]  [facility] [address] [facility] Emergency Department Emergency Medicine  with any worsening symptoms [phone]  [phone]   [facility] SPECIALTY [facility] Emergency Department, [facility] [address], [place], [address] [facility], [address]          Discharge Medication List as of 3/29/2021  8:15 PM      CONTINUE these medications which have NOT CHANGED    Details   acetaminophen (TYLENOL) 325 mg tablet Take 2 tablets by mouth every 6 (six) hours as needed (pain), Starting Tue 7/18/2017, Print           No discharge procedures on file      PDMP Review     None          ED Provider  Electronically Signed by           Diana Oscar MD  03/29/21 8038

## 2021-03-29 NOTE — ED NOTES
Spoke with patient via phone and completed the crisis intake assessment as well as the safety risk assessment  Patient arrived to the ER via private vehicle  Patient states he has been increasingly depressed  " I'm honestly just here because I need some outpatient  I need to see someone for medications and therapy " Patient at current denies SI/HI as well as hallucinations and paranoia  Patient does report some mild disturbances with sleep and appetite, as well as lack of concentration  Patient states he was seeing a therapist for a while, but the therapist kept cancelling so he stopped services  Primary stressor unresolved grief and loss, mother passed from suicide, January 2020  Case discussed with ER MD who is in agreement with discharge home and follow up with outpatient services  Patient was given a list of local outpatient resources and was also educated if symptoms continue or worsen to return to the nearest ER and is in agreement to do as such

## 2021-03-29 NOTE — ED NOTES
Patient states that he has been depressed since January when his mother committed suicide  Patient has no active HI/SI but states intermittently he has had thoughts to "end it"  Enmanuel for safety while in ED  Father at bedside with patient  Patient states he was seeing a therapist who kept canceling appointments and has not spoke to someone since he stopped going  Patient states he wants outpatient services and to speak to crisis worker        Rosio Muhammad RN  03/29/21 9215

## 2021-04-05 ENCOUNTER — OFFICE VISIT (OUTPATIENT)
Dept: SLEEP CENTER | Facility: CLINIC | Age: 21
End: 2021-04-05
Payer: COMMERCIAL

## 2021-04-05 VITALS
DIASTOLIC BLOOD PRESSURE: 88 MMHG | SYSTOLIC BLOOD PRESSURE: 142 MMHG | TEMPERATURE: 98 F | HEIGHT: 70 IN | OXYGEN SATURATION: 97 % | HEART RATE: 80 BPM | BODY MASS INDEX: 45.1 KG/M2 | WEIGHT: 315 LBS

## 2021-04-05 DIAGNOSIS — G47.33 OSA (OBSTRUCTIVE SLEEP APNEA): Primary | ICD-10-CM

## 2021-04-05 DIAGNOSIS — Z71.89 CPAP USE COUNSELING: ICD-10-CM

## 2021-04-05 PROCEDURE — 3008F BODY MASS INDEX DOCD: CPT | Performed by: PSYCHIATRY & NEUROLOGY

## 2021-04-05 PROCEDURE — 99244 OFF/OP CNSLTJ NEW/EST MOD 40: CPT | Performed by: PSYCHIATRY & NEUROLOGY

## 2021-04-05 PROCEDURE — 1036F TOBACCO NON-USER: CPT | Performed by: PSYCHIATRY & NEUROLOGY

## 2021-04-05 NOTE — LETTER
2021     Evy Vazquez DO  [address] [facility]  [address]  [place] [id number]    Patient: Isaías Velazquez   YOB: 2000   Date of Visit: 2021       Dear   [facility]: Thank you for referring Bobo Hernandez to me for evaluation  Below are my notes for this consultation  If you have questions, please do not hesitate to call me  I look forward to following your patient along with you  Sincerely,        Eduardo Loo MD        CC: No Recipients  Eduardo Loo MD  2021  3:43 PM  Sign when Signing Visit  Sleep Medicine Consultation Note    HPI:  Mr Isaías Velazquez is a 23 y o  male seen at the request of Evy Vazquez DO for advice regarding suspected sleep disordered breathing  The patient had an HST done 2021 which was diagnostic for ADELE: respiratory event index (NATHALY) of 42 8  The lowest SpO2 recorded is 79%  The patient endorsed feeling tired all the time  He also has lack of energy  He can sleep 10 hours and wake up feeling like he just laid down  This has been an issue for 3-4 years  He has gained about 20-30 lbs in that time without making changes to his diet  He is unsure why he has gained weight  He has been snoring since he was a child and then had a PSG with subsequent T&A       Please see below for continuation of the HPI:      Sleep Disordered Breathing:  -Snoring: yes   -Severity: loudly   -Frequency: nightly   -Duration: since childhood   -Over time: worse   -Modifying factors: weight gain  -Observed Apneas: yes  -Mouth Breathing at night: yes  -Dry Mouth in morning: yes   -Nocturnal Gasping: yes  -Nasal Obstruction: no  -Weight: see HPI    Sleep Pattern:  -Location: bedroom  -Bed/Recliner/Wedge: bed  -Bed Partner: yes  -HOB: flat  -# of pillows under head: 2  -Position: back  -Bedtime: 9-930pm  -Lights out: same time  -Environmental: he is on his phone  -Latency: 2h  -Awakenings: 1-2   -Reason: void   -Duration: mins  -Wake time: 630am   -Alarm: yes  -Rise time: same time  -Days off: 11p/12a (or later) and wakes up at 8a-3p  -Shift Work: 7a-9p  -Patient's estimate of total sleep time: 8-9h    Daytime Symptoms:  -Upon Awakening: tired  -Daytime fatigue/sleepiness: tired and sleepy most of the day  -Naps: no  -Involuntary Dozing: no  -Cognitive Symptoms: no  -Driving: Difficulty with sleepiness and driving:  no   -- Close calls related to sleepiness: no   -- Accidents related to sleepiness: no      Questionnaires:   Sitting and reading: Slight chance of dozing  Watching TV: Would never doze  Sitting, inactive in a public place (e g  a theatre or a meeting): Would never doze  As a passenger in a car for an hour without a break: Would never doze  Lying down to rest in the afternoon when circumstances permit: Moderate chance of dozing  Sitting and talking to someone: Would never doze  Sitting quietly after a lunch without alcohol: Would never doze  In a car, while stopped for a few minutes in traffic: Would never doze  Total score: 3      Sleep Review of Symptoms:  -Parasomnias:  --Sleep Walking: no  --Dream Enactment: no  --Bruxism: no  -Motor:  --RLS: this happens sometimes at night and they will keep him from falling asleep when he lays down     --PLMS: no  -Narcolepsy:  --Hallucinations: no  --Paralysis: no  --Cataplexy: no    Childhood Sleep History: see HPI    Prior Sleep Studies/Evaluations:  See HPI    Family History:  Family history of sleep disorders: dad with ADELE    Patient Active Problem List   Diagnosis    Asperger syndrome    Intermittent explosive disorder    Hearing difficulty    GERD without esophagitis    MTHFR mutation ([facility] [address]    Nausea    Obstructive sleep apnea of adult     Past Medical History:   Diagnosis Date    Asperger syndrome     Per mother   Veronica Carr Exogenous obesity     Resolved 3/22/2017     Hearing difficulty     "3 second delay in sounds getting to brain" per mother    Intermittent explosive disorder     per mother    Lyme disease     MTHFR mutation ([facility] [address] )     Psychiatric disorder     autism       --> Denies any cardiopulmonary disease  --> Seizure hx: denies  --> Head injury with LOC: denies  --> Supplemental Oxygen Use: denies    Labs     Results for Cisco Dancer (MRN [id number]) as of 4/5/2021 15:20   Ref   Range 12/11/2018 01:57   Sodium Latest Ref Range: 136 - 145 mmol/L 144   Potassium Latest Ref Range: 3 5 - 5 3 mmol/L 4 1   Chloride Latest Ref Range: 100 - 108 mmol/L 104   CO2 Latest Ref Range: 21 - 32 mmol/L 29   Anion Gap Latest Ref Range: 4 - 13 mmol/L 11   BUN Latest Ref Range: 5 - 25 mg/dL 12   Creatinine Latest Ref Range: 0 60 - 1 30 mg/dL 1 00   Glucose, Random Latest Ref Range: 65 - 140 mg/dL 86   Calcium Latest Ref Range: 8 3 - 10 1 mg/dL 9 1   AST Latest Ref Range: 5 - 45 U/L 24   ALT Latest Ref Range: 12 - 78 U/L 55   Alkaline Phosphatase Latest Ref Range: 46 - 484 U/L 81   Total Protein Latest Ref Range: 6 4 - 8 2 g/dL 8 5 (H)   Albumin Latest Ref Range: 3 5 - 5 0 g/dL 4 3   TOTAL BILIRUBIN Latest Ref Range: 0 20 - 1 00 mg/dL 0 60   eGFR Latest Units: ml/min/1 73sq m 109   Magnesium Latest Ref Range: 1 6 - 2 6 mg/dL 2 1   Lipase Latest Ref Range: 73 - 393 u/L 120   WBC Latest Ref Range: 4 31 - 10 16 Thousand/uL 9 82   Red Blood Cell Count Latest Ref Range: 3 88 - 5 62 Million/uL 5 98 (H)   Hemoglobin Latest Ref Range: 12 0 - 17 0 g/dL 17 5 (H)   HCT Latest Ref Range: 36 5 - 49 3 % 49 4 (H)   MCV Latest Ref Range: 82 - 98 fL 83   MCH Latest Ref Range: 26 8 - 34 3 pg 29 3   MCHC Latest Ref Range: 31 4 - 37 4 g/dL 35 4   RDW Latest Ref Range: 11 6 - 15 1 % 12 0   Platelet Count Latest Ref Range: 149 - 390 Thousands/uL 256   MPV Latest Ref Range: 8 9 - 12 7 fL 10 8   nRBC Latest Units: /100 WBCs 0   Neutrophils % Latest Ref Range: 43 - 75 % 65   Immat GRANS % Latest Ref Range: 0 - 2 % 0   Lymphocytes Relative Latest Ref Range: 14 - 44 % 25   Monocytes Relative Latest Ref Range: 4 - 12 % 8 Eosinophils Latest Ref Range: 0 - 6 % 1   Basophils Relative Latest Ref Range: 0 - 1 % 1   Immature Grans Absolute Latest Ref Range: 0 00 - 0 20 Thousand/uL 0 03   Absolute Neutrophils Latest Ref Range: 1 85 - 7 62 Thousands/µL 6 49   Lymphocytes Absolute Latest Ref Range: 0 60 - 4 47 Thousands/µL 2 41   Absolute Monocytes Latest Ref Range: 0 17 - 1 22 Thousand/µL 0 75   Absolute Eosinophils Latest Ref Range: 0 00 - 0 61 Thousand/µL 0 09   Basophils Absolute Latest Ref Range: 0 00 - 0 10 Thousands/µL 0 05   TSH 3RD GENERATON Latest Ref Range: 0 463 - 3 980 uIU/mL 2 626   LYME 18 KD IGG Unknown Absent   LYME 23 KD IGG Unknown Present (A)   LYME 23 KD IGM Unknown Present (A)   LYME 28 KD IGG Unknown Absent   LYME 30 KD IGG Unknown Absent   LYME 39 KD IGG Unknown Absent   LYME 39 KD IGM Unknown Absent   LYME 41 KD IGG Unknown Present (A)   LYME 41 KD IGM Unknown Absent   LYME 45 KD IGG Unknown Absent   LYME 58 KD IGG Unknown Absent   LYME 66 KD IGG Unknown Absent   LYME 93 KD IGG Unknown Absent   LYME AB IGG Latest Ref Range: 0 00 - 0 79  0 47   LYME AB IGM Latest Ref Range: 0 00 - 0 79  1 00 (H)   Lyme IgG WB Interp  Unknown Negative   Lyme IgM WB Interp  Unknown Negative       Past Surgical History:   Procedure Laterality Date    ADENOIDECTOMY      FRACTURE SURGERY      nasal fx with cosmetic repair    NASAL DILATION      TONSILECTOMY AND ADNOIDECTOMY      TONSILLECTOMY AND ADENOIDECTOMY         Current Outpatient Medications   Medication Sig Dispense Refill    acetaminophen (TYLENOL) 325 mg tablet Take 2 tablets by mouth every 6 (six) hours as needed (pain) 30 tablet 0     No current facility-administered medications for this visit  Social History:  -Employment:   -Smokin cans of chewing tobacco a day  -Caffeine: energy drink 0-1 a day    -Alcohol: not often  -THC: no  -OTC/Supplements/herbals: no  -Illicits:  denies  -Family: lives with eden    ROS:  Genitourinary none Cardiology none   Gastrointestinal none   Neurology muscle weakness and forgetfulness   Constitutional fatigue and excessive sweating at night   Integumentary none   Psychiatry mood change   Musculoskeletal joint pain, muscle aches, back pain, legs twitching/jerking and leg cramps   Pulmonary snoring   ENT none   Endocrine none   Hematological none       MSE:  -Alert and appropriate: alert, calm, cooperative  -Oriented to person, place and time:  name, age, location, day/date/mon/yr  -Behavior: good, sustained eye contact  -Speech: Unremarkable rate/rhythm/volume  -Mood: "tired"  -Affect: constricted  -Thought Processes: linear, logical, goal directed  PE:  Body mass index is 45 77 kg/m²  Vitals:    04/05/21 1500   BP: 142/88   BP Location: Left arm   Cuff Size: Large   Pulse: 80   Temp: 98 °F (36 7 °C)   TempSrc: Temporal   SpO2: 97%   Weight: (!) 145 kg (319 lb)   Height: 5' 10" (1 778 m)       -General:  In NAD    -Eyes: Conjunctival injection: none     -EOM:  PERRLA, EOMI   -Eyelid hooding: yes    -ENT: MP: 4/4   -Facial deformity: no retrognathia   -Hard palate: high and peaked arch   -Soft palate: low set palate and crowding   -Gums and teeth: normal dentition   -Tongue:  Scalloping   -Nares:  Patent    -Neck/Lymphatics: Lymphadenopathy:  none appreciated   -Masses:  none appreciated   -Circumference: Neck Circumference: 19 "    -Cardiac: Auscultation:  RRR   - LE edema over shins: none appreciated    -Pulm: -Respirations: unlaboured         -Auscultation:  CTA bilaterally, posterior fields    -Neuro: No resting tremor     -Musculoskeletal: Gait and stance: normal turning and ambulation; unremarkable  Assessment:  Mr Don Nielsen is a 23 y o  male who is seen to evaluate for treatment of obstructive sleep apnea  The patient has severe ADELE and is symptomatic with nocturnal and daytime sleepiness   The pathophysiology of, the reasons to treat and treatment options for obstructive sleep apnea were all reviewed with the patient today  He is amenable to treatment with PAP therapy  Discussed keeping nasal passages clear, abstaining from alcohol, and other sedating drugs at night- which will worsen symptoms of ADELE  --History provided by: patient   --Records reviewed: in chart and HST      Recommendations:  1) APAP 6-16cm with FFM  2) Driving safety was reviewed with patient  If the patient feels too sleepy to drive he knows not to drive  If he becomes sleepy while driving he will pull over and nap  3) Follow-up 6-8 weeks after initiation of treatment  4) Call with any questions or concerns  All questions answered for the patient, who indicated understanding and agreed with the plan       Nevaeh Hernandez MD  Psychiatry/ Sleep medicine

## 2021-04-05 NOTE — PATIENT INSTRUCTIONS
Recommendations:  1) APAP 6-16cm with FFM  2) Driving safety was reviewed with patient  If the patient feels too sleepy to drive he knows not to drive  If he becomes sleepy while driving he will pull over and nap    3) Follow-up 6-8 weeks after initiation of treatment  4) Call with any questions or concerns

## 2021-04-05 NOTE — PROGRESS NOTES
Sleep Medicine Consultation Note    HPI:  Mr Jonnie Callahan is a 23 y o  male seen at the request of Mara Severance, DO for advice regarding suspected sleep disordered breathing  The patient had an HST done 2/2021 which was diagnostic for ADELE: respiratory event index (NATHALY) of 42 8  The lowest SpO2 recorded is 79%  The patient endorsed feeling tired all the time  He also has lack of energy  He can sleep 10 hours and wake up feeling like he just laid down  This has been an issue for 3-4 years  He has gained about 20-30 lbs in that time without making changes to his diet  He is unsure why he has gained weight  He has been snoring since he was a child and then had a PSG with subsequent T&A       Please see below for continuation of the HPI:      Sleep Disordered Breathing:  -Snoring: yes   -Severity: loudly   -Frequency: nightly   -Duration: since childhood   -Over time: worse   -Modifying factors: weight gain  -Observed Apneas: yes  -Mouth Breathing at night: yes  -Dry Mouth in morning: yes   -Nocturnal Gasping: yes  -Nasal Obstruction: no  -Weight: see HPI    Sleep Pattern:  -Location: bedroom  -Bed/Recliner/Wedge: bed  -Bed Partner: yes  -HOB: flat  -# of pillows under head: 2  -Position: back  -Bedtime: 9-930pm  -Lights out: same time  -Environmental: he is on his phone  -Latency: 2h  -Awakenings: 1-2   -Reason: void   -Duration: mins  -Wake time: 630am   -Alarm: yes  -Rise time: same time  -Days off: 11p/12a (or later) and wakes up at 8a-3p  -Shift Work: 7a-9p  -Patient's estimate of total sleep time: 8-9h    Daytime Symptoms:  -Upon Awakening: tired  -Daytime fatigue/sleepiness: tired and sleepy most of the day  -Naps: no  -Involuntary Dozing: no  -Cognitive Symptoms: no  -Driving: Difficulty with sleepiness and driving:  no   -- Close calls related to sleepiness: no   -- Accidents related to sleepiness: no      Questionnaires:   Sitting and reading: Slight chance of dozing  Watching TV: Would never doze  Sitting, inactive in a public place (e g  a theatre or a meeting): Would never doze  As a passenger in a car for an hour without a break: Would never doze  Lying down to rest in the afternoon when circumstances permit: Moderate chance of dozing  Sitting and talking to someone: Would never doze  Sitting quietly after a lunch without alcohol: Would never doze  In a car, while stopped for a few minutes in traffic: Would never doze  Total score: 3      Sleep Review of Symptoms:  -Parasomnias:  --Sleep Walking: no  --Dream Enactment: no  --Bruxism: no  -Motor:  --RLS: this happens sometimes at night and they will keep him from falling asleep when he lays down  --PLMS: no  -Narcolepsy:  --Hallucinations: no  --Paralysis: no  --Cataplexy: no    Childhood Sleep History: see HPI    Prior Sleep Studies/Evaluations:  See HPI    Family History:  Family history of sleep disorders: dad with ADELE    Patient Active Problem List   Diagnosis    Asperger syndrome    Intermittent explosive disorder    Hearing difficulty    GERD without esophagitis    [facility]    Nausea    Obstructive sleep apnea of adult     Past Medical History:   Diagnosis Date    Asperger syndrome     Per mother   Kelli Torres Exogenous obesity     Resolved 3/22/2017     Hearing difficulty     "3 second delay in sounds getting to brain" per mother    Intermittent explosive disorder     per mother    Lyme disease     [facility] mutation [facility]     Psychiatric disorder     autism       --> Denies any cardiopulmonary disease  --> Seizure hx: denies  --> Head injury with LOC: denies  --> Supplemental Oxygen Use: denies    Labs     Results for Aleida Lopez (MRN [id number]) as of 4/5/2021 15:20   Ref   Range 12/11/2018 01:57   Sodium Latest Ref Range: 136 - 145 mmol/L 144   Potassium Latest Ref Range: 3 5 - 5 3 mmol/L 4 1   Chloride Latest Ref Range: 100 - 108 mmol/L 104   CO2 Latest Ref Range: 21 - 32 mmol/L 29   Anion Gap Latest Ref Range: 4 - 13 mmol/L 11   BUN Latest Ref Range: 5 - 25 mg/dL 12   Creatinine Latest Ref Range: 0 60 - 1 30 mg/dL 1 00   Glucose, Random Latest Ref Range: 65 - 140 mg/dL 86   Calcium Latest Ref Range: 8 3 - 10 1 mg/dL 9 1   AST Latest Ref Range: 5 - 45 U/L 24   ALT Latest Ref Range: 12 - 78 U/L 55   Alkaline Phosphatase Latest Ref Range: 46 - 484 U/L 81   Total Protein Latest Ref Range: 6 4 - 8 2 g/dL 8 5 (H)   Albumin Latest Ref Range: 3 5 - 5 0 g/dL 4 3   TOTAL BILIRUBIN Latest Ref Range: 0 20 - 1 00 mg/dL 0 60   eGFR Latest Units: ml/min/1 73sq m 109   Magnesium Latest Ref Range: 1 6 - 2 6 mg/dL 2 1   Lipase Latest Ref Range: 73 - 393 u/L 120   WBC Latest Ref Range: 4 31 - 10 16 Thousand/uL 9 82   Red Blood Cell Count Latest Ref Range: 3 88 - 5 62 Million/uL 5 98 (H)   Hemoglobin Latest Ref Range: 12 0 - 17 0 g/dL 17 5 (H)   HCT Latest Ref Range: 36 5 - 49 3 % 49 4 (H)   MCV Latest Ref Range: 82 - 98 fL 83   MCH Latest Ref Range: 26 8 - 34 3 pg 29 3   MCHC Latest Ref Range: 31 4 - 37 4 g/dL 35 4   RDW Latest Ref Range: 11 6 - 15 1 % 12 0   Platelet Count Latest Ref Range: 149 - 390 Thousands/uL 256   MPV Latest Ref Range: 8 9 - 12 7 fL 10 8   nRBC Latest Units: /100 WBCs 0   Neutrophils % Latest Ref Range: 43 - 75 % 65   Immat GRANS % Latest Ref Range: 0 - 2 % 0   Lymphocytes Relative Latest Ref Range: 14 - 44 % 25   Monocytes Relative Latest Ref Range: 4 - 12 % 8   Eosinophils Latest Ref Range: 0 - 6 % 1   Basophils Relative Latest Ref Range: 0 - 1 % 1   Immature Grans Absolute Latest Ref Range: 0 00 - 0 20 Thousand/uL 0 03   Absolute Neutrophils Latest Ref Range: 1 85 - 7 62 Thousands/µL 6 49   Lymphocytes Absolute Latest Ref Range: 0 60 - 4 47 Thousands/µL 2 41   Absolute Monocytes Latest Ref Range: 0 17 - 1 22 Thousand/µL 0 75   Absolute Eosinophils Latest Ref Range: 0 00 - 0 61 Thousand/µL 0 09   Basophils Absolute Latest Ref Range: 0 00 - 0 10 Thousands/µL 0 05   TSH 3RD GENERATON Latest Ref Range: 0 463 - 3 980 uIU/mL 2 626   LYME 18 KD IGG Unknown Absent   LYME 23 KD IGG Unknown Present (A)   LYME 23 KD IGM Unknown Present (A)   LYME 28 KD IGG Unknown Absent   LYME 30 KD IGG Unknown Absent   LYME 39 KD IGG Unknown Absent   LYME 39 KD IGM Unknown Absent   LYME 41 KD IGG Unknown Present (A)   LYME 41 KD IGM Unknown Absent   LYME 45 KD IGG Unknown Absent   LYME 58 KD IGG Unknown Absent   LYME 66 KD IGG Unknown Absent   LYME 93 KD IGG Unknown Absent   LYME AB IGG Latest Ref Range: 0 00 - 0 79  0 47   LYME AB IGM Latest Ref Range: 0 00 - 0 79  1 00 (H)   Lyme IgG WB Interp  Unknown Negative   Lyme IgM WB Interp  Unknown Negative       Past Surgical History:   Procedure Laterality Date    ADENOIDECTOMY      FRACTURE SURGERY      nasal fx with cosmetic repair    NASAL DILATION      TONSILECTOMY AND ADNOIDECTOMY      TONSILLECTOMY AND ADENOIDECTOMY         Current Outpatient Medications   Medication Sig Dispense Refill    acetaminophen (TYLENOL) 325 mg tablet Take 2 tablets by mouth every 6 (six) hours as needed (pain) 30 tablet 0     No current facility-administered medications for this visit  Social History:  -Employment:   -Smokin cans of chewing tobacco a day  -Caffeine: energy drink 0-1 a day    -Alcohol: not often  -THC: no  -OTC/Supplements/herbals: no  -Illicits:  denies  -Family: lives with eden    ROS:  Genitourinary none   Cardiology none   Gastrointestinal none   Neurology muscle weakness and forgetfulness   Constitutional fatigue and excessive sweating at night   Integumentary none   Psychiatry mood change   Musculoskeletal joint pain, muscle aches, back pain, legs twitching/jerking and leg cramps   Pulmonary snoring   ENT none   Endocrine none   Hematological none       MSE:  -Alert and appropriate: alert, calm, cooperative  -Oriented to person, place and time:  name, age, location, day/date/mon/yr  -Behavior: good, sustained eye contact  -Speech: Unremarkable rate/rhythm/volume  -Mood: "tired"  -Affect: constricted  -Thought Processes: linear, logical, goal directed  PE:  Body mass index is 45 77 kg/m²  Vitals:    04/05/21 1500   BP: 142/88   BP Location: Left arm   Cuff Size: Large   Pulse: 80   Temp: 98 °F (36 7 °C)   TempSrc: Temporal   SpO2: 97%   Weight: (!) 145 kg (319 lb)   Height: 5' 10" (1 778 m)       -General:  In NAD    -Eyes: Conjunctival injection: none     -EOM:  PERRLA, EOMI   -Eyelid hooding: yes    -ENT: MP: 4/4   -Facial deformity: no retrognathia   -Hard palate: high and peaked arch   -Soft palate: low set palate and crowding   -Gums and teeth: normal dentition   -Tongue:  Scalloping   -Nares:  Patent    -Neck/Lymphatics: Lymphadenopathy:  none appreciated   -Masses:  none appreciated   -Circumference: Neck Circumference: 19 "    -Cardiac: Auscultation:  RRR   - LE edema over shins: none appreciated    -Pulm: -Respirations: unlaboured         -Auscultation:  CTA bilaterally, posterior fields    -Neuro: No resting tremor     -Musculoskeletal: Gait and stance: normal turning and ambulation; unremarkable  Assessment:  Mr Morteza Fisher is a 23 y o  male who is seen to evaluate for treatment of obstructive sleep apnea  The patient has severe ADELE and is symptomatic with nocturnal and daytime sleepiness  The pathophysiology of, the reasons to treat and treatment options for obstructive sleep apnea were all reviewed with the patient today  He is amenable to treatment with PAP therapy  Discussed keeping nasal passages clear, abstaining from alcohol, and other sedating drugs at night- which will worsen symptoms of ADELE  --History provided by: patient   --Records reviewed: in chart and HST      Recommendations:  1) APAP 6-16cm with FFM  2) Driving safety was reviewed with patient  If the patient feels too sleepy to drive he knows not to drive  If he becomes sleepy while driving he will pull over and nap    3) Follow-up 6-8 weeks after initiation of treatment  4) Call with any questions or concerns  All questions answered for the patient, who indicated understanding and agreed with the plan       Carmen Valero MD  Psychiatry/ Sleep medicine

## 2021-04-08 ENCOUNTER — TELEPHONE (OUTPATIENT)
Dept: SLEEP CENTER | Facility: CLINIC | Age: 21
End: 2021-04-08

## 2021-06-07 ENCOUNTER — OFFICE VISIT (OUTPATIENT)
Dept: SLEEP CENTER | Facility: CLINIC | Age: 21
End: 2021-06-07
Payer: COMMERCIAL

## 2021-06-07 ENCOUNTER — TELEPHONE (OUTPATIENT)
Dept: SLEEP CENTER | Facility: CLINIC | Age: 21
End: 2021-06-07

## 2021-06-07 VITALS
HEART RATE: 95 BPM | TEMPERATURE: 97.6 F | OXYGEN SATURATION: 97 % | WEIGHT: 312 LBS | HEIGHT: 70 IN | BODY MASS INDEX: 44.67 KG/M2 | DIASTOLIC BLOOD PRESSURE: 92 MMHG | SYSTOLIC BLOOD PRESSURE: 140 MMHG

## 2021-06-07 DIAGNOSIS — G47.33 OSA (OBSTRUCTIVE SLEEP APNEA): Primary | ICD-10-CM

## 2021-06-07 PROCEDURE — 3008F BODY MASS INDEX DOCD: CPT | Performed by: PSYCHIATRY & NEUROLOGY

## 2021-06-07 PROCEDURE — 1036F TOBACCO NON-USER: CPT | Performed by: PSYCHIATRY & NEUROLOGY

## 2021-06-07 PROCEDURE — 99214 OFFICE O/P EST MOD 30 MIN: CPT | Performed by: PSYCHIATRY & NEUROLOGY

## 2021-06-07 NOTE — TELEPHONE ENCOUNTER
I received an Rx to change the pressure to 10-16cm  This was done in Care  and I advised the pt  In the office

## 2021-06-07 NOTE — PROGRESS NOTES
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  He sleeps for about 5 hours during the morning  He reports using his CPAP some nights and when he can  He forgets that he has it sometimes and he falls asleep  He sometimes wakes up with the mask is off of his face when he moves around in his sleep  This only happens when he wakes up on the other side of the bed  Treatment: APAP  -Pressure: 6-16cm   -Pressure intolerance: when he first puts it on he uses the ramp button   -Aerophagia: no   -Air Hunger: no  -Interface: FFM  -Fit: good  -Chin strap: no  -HCC: YME  -Patient's perceived outcome: he has more energy than he did before       Compliance Card Data:    - Date Range: 21-21   - Settin-16cm   - Pressure: Median 8 9cm; 90th%ile = 11 9cm   - Residual AHI: 3 3   - %  Night in Large Leak: 1h   - Average usage days used: 5h 36m   - % Days used: 57%   - % Days with Usage > 4 hrs: 53%    Respiratory:  -Ongoing Snoring: unsure  -Mouth Breathing: yes  -Dry Mouth: yes  -Nocturnal Gasping: no    ROS:   Genitourinary none   Cardiology none   Gastrointestinal none   Neurology muscle weakness   Constitutional fatigue   Integumentary none   Psychiatry anxiety and depression   Musculoskeletal joint pain and leg cramps   Pulmonary snoring   ENT none   Endocrine none   Hematological none         Sleep Pattern:  -Position: back and side  -Bedtime: 5-7am  -Lights Out: same time  -Environment: no Lights/TV  -Latency: 30 min  -Awakenings: 0-1  -Wake Time: 11am  -Rise Time: same time  -Patient's estimate of Sleep Time: 4-6h      Daytime Symptoms:  -Upon Awakening: rested and its a lot easier to get out of bed  -Daytime fatigue/sleepiness: has more energy during the day  Denied sleepiness   -Naps: once in a while when he can between jobs  -Involuntary Dozing: no  -Cognitive Symptoms: no  -Driving: Difficulty with sleepiness and driving:  no   -- Close calls related to sleepiness: no   -- Accidents related to sleepiness: no    Substance Use:  -Caffeine: occasionally will have an energy drink (1-2 times a week while driving at night vs daily prior to use)  -Alcohol: no  -THC: no    --> Denies any significant medical changes since last visit  --> Supplemental Oxygen Use: denies    Questionnaire:  Sitting and reading: Would never doze  Watching TV: Would never doze  Sitting, inactive in a public place (e g  a theatre or a meeting): Would never doze  As a passenger in a car for an hour without a break: Would never doze  Lying down to rest in the afternoon when circumstances permit: Moderate chance of dozing  Sitting and talking to someone: Would never doze  Sitting quietly after a lunch without alcohol: Would never doze  In a car, while stopped for a few minutes in traffic: Would never doze  Total score: 2      PE:    /92 (BP Location: Left arm, Cuff Size: Large)   Pulse 95   Temp 97 6 °F (36 4 °C) (Temporal)   Ht 5' 10" (1 778 m)   Wt (!) 142 kg (312 lb)   SpO2 97%   BMI 44 77 kg/m²     General:  In NAD  Pul: Respirations: unlaboured  MS: No atrophy  Neuro: No resting tremor  Gait normal turning & station; unremarkable overall  Psych: Socially appropriate  Pleasant  No overt dysphoria  Assessment: The patient has not been fully compliant withhis CPAP and his obstructive events are well controlled with a residual AHI 3 3  He is deriving benefit from using his CPAP as he is less tired than he was in the past, has more energy, and cut down on energy drinks from daily to 1-2 times a week   Will change his pressure today to accommodate his download and get him a longer mask  He will be using it more consistently now as he had a new job and was working multiple jobs  Recommendations:    1) APAP at 10-16cm with FFM  Use ramp  2) Safe driving reviewed  3) Follow-up 2 months  4) Call with any questions or concerns  All questions answered for the patient, who indicated understanding and agreed with the plan       Treasure Barraza MD  Psychiatry/ Sleep medicine

## 2021-06-07 NOTE — PATIENT INSTRUCTIONS
Recommendations:    1) APAP at 10-16cm with FFM  Use ramp  2) Safe driving reviewed  3) Follow-up 2 months  4) Call with any questions or concerns

## 2021-06-08 ENCOUNTER — TELEPHONE (OUTPATIENT)
Dept: SLEEP CENTER | Facility: CLINIC | Age: 21
End: 2021-06-08

## 2021-10-18 ENCOUNTER — OFFICE VISIT (OUTPATIENT)
Dept: FAMILY MEDICINE CLINIC | Facility: CLINIC | Age: 21
End: 2021-10-18
Payer: COMMERCIAL

## 2021-10-18 VITALS
WEIGHT: 311.4 LBS | HEART RATE: 97 BPM | OXYGEN SATURATION: 99 % | DIASTOLIC BLOOD PRESSURE: 82 MMHG | TEMPERATURE: 97.4 F | SYSTOLIC BLOOD PRESSURE: 136 MMHG | HEIGHT: 70 IN | BODY MASS INDEX: 44.58 KG/M2

## 2021-10-18 DIAGNOSIS — Z13.6 SCREENING FOR CARDIOVASCULAR CONDITION: ICD-10-CM

## 2021-10-18 DIAGNOSIS — E66.01 MORBID OBESITY WITH BMI OF 40.0-44.9, ADULT (HCC): ICD-10-CM

## 2021-10-18 DIAGNOSIS — F32.1 MODERATE MAJOR DEPRESSION, SINGLE EPISODE (HCC): Primary | ICD-10-CM

## 2021-10-18 PROCEDURE — 3725F SCREEN DEPRESSION PERFORMED: CPT | Performed by: FAMILY MEDICINE

## 2021-10-18 PROCEDURE — 3008F BODY MASS INDEX DOCD: CPT | Performed by: FAMILY MEDICINE

## 2021-10-18 PROCEDURE — 1036F TOBACCO NON-USER: CPT | Performed by: FAMILY MEDICINE

## 2021-10-18 PROCEDURE — 99214 OFFICE O/P EST MOD 30 MIN: CPT | Performed by: FAMILY MEDICINE

## 2021-10-18 RX ORDER — DULOXETIN HYDROCHLORIDE 30 MG/1
30 CAPSULE, DELAYED RELEASE ORAL DAILY
Qty: 30 CAPSULE | Refills: 2 | Status: SHIPPED | OUTPATIENT
Start: 2021-10-18 | End: 2021-11-19

## 2021-11-19 ENCOUNTER — OFFICE VISIT (OUTPATIENT)
Dept: FAMILY MEDICINE CLINIC | Facility: CLINIC | Age: 21
End: 2021-11-19
Payer: COMMERCIAL

## 2021-11-19 VITALS
HEIGHT: 70 IN | WEIGHT: 302.6 LBS | TEMPERATURE: 97.7 F | DIASTOLIC BLOOD PRESSURE: 88 MMHG | OXYGEN SATURATION: 98 % | SYSTOLIC BLOOD PRESSURE: 148 MMHG | HEART RATE: 100 BPM | BODY MASS INDEX: 43.32 KG/M2

## 2021-11-19 DIAGNOSIS — F32.1 MODERATE MAJOR DEPRESSION, SINGLE EPISODE (HCC): Primary | ICD-10-CM

## 2021-11-19 PROCEDURE — 1036F TOBACCO NON-USER: CPT | Performed by: FAMILY MEDICINE

## 2021-11-19 PROCEDURE — 3008F BODY MASS INDEX DOCD: CPT | Performed by: FAMILY MEDICINE

## 2021-11-19 PROCEDURE — 99213 OFFICE O/P EST LOW 20 MIN: CPT | Performed by: FAMILY MEDICINE

## 2021-11-19 RX ORDER — DULOXETIN HYDROCHLORIDE 30 MG/1
30 CAPSULE, DELAYED RELEASE ORAL DAILY
Qty: 30 CAPSULE | Refills: 2 | Status: SHIPPED | OUTPATIENT
Start: 2021-11-19

## 2023-06-12 NOTE — TELEPHONE ENCOUNTER
Patient has a current prior Auth in place     Coverage granted on [employer]  04/29/2021 to 04/28/2024 Patient's father called to request a referral for counseling therapy  States the family is having a hard time coping with the loss of their mother

## 2023-10-08 ENCOUNTER — APPOINTMENT (EMERGENCY)
Dept: CT IMAGING | Facility: HOSPITAL | Age: 23
End: 2023-10-08
Payer: COMMERCIAL

## 2023-10-08 ENCOUNTER — HOSPITAL ENCOUNTER (EMERGENCY)
Facility: HOSPITAL | Age: 23
Discharge: HOME | End: 2023-10-08
Attending: EMERGENCY MEDICINE
Payer: COMMERCIAL

## 2023-10-08 VITALS
DIASTOLIC BLOOD PRESSURE: 101 MMHG | TEMPERATURE: 98 F | RESPIRATION RATE: 18 BRPM | OXYGEN SATURATION: 97 % | SYSTOLIC BLOOD PRESSURE: 194 MMHG | HEART RATE: 105 BPM
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
## 2023-10-08 DIAGNOSIS — S22.32XA LEFT RIB FRACTURE: ICD-10-CM

## 2023-10-08 PROCEDURE — 71250 CT THORAX DX C-: CPT

## 2023-10-08 PROCEDURE — 93005 ELECTROCARDIOGRAM TRACING: CPT

## 2023-10-08 PROCEDURE — 99284 EMERGENCY DEPT VISIT MOD MDM: CPT

## 2023-10-08 PROCEDURE — G1004 CDSM NDSC: HCPCS

## 2023-10-08 PROCEDURE — 99284 EMERGENCY DEPT VISIT MOD MDM: CPT | Performed by: EMERGENCY MEDICINE

## 2023-10-08 RX ORDER — LIDOCAINE 50 MG/G
1 PATCH TOPICAL ONCE
Status: DISCONTINUED | OUTPATIENT
Start: 2023-10-08 | End: 2023-10-08 | Stop reason: HOSPADM

## 2023-10-08 RX ORDER — ACETAMINOPHEN 325 MG/1
975 TABLET ORAL ONCE
Status: COMPLETED | OUTPATIENT
Start: 2023-10-08 | End: 2023-10-08

## 2023-10-08 RX ORDER — IBUPROFEN 400 MG/1
400 TABLET ORAL ONCE
Status: COMPLETED | OUTPATIENT
Start: 2023-10-08 | End: 2023-10-08

## 2023-10-08 RX ADMIN — LIDOCAINE 5% 1 PATCH: 700 PATCH TOPICAL at 18:03

## 2023-10-08 RX ADMIN — IBUPROFEN 400 MG: 400 TABLET, FILM COATED ORAL at 18:03

## 2023-10-08 RX ADMIN — ACETAMINOPHEN 975 MG: 325 TABLET, FILM COATED ORAL at 18:03

## 2023-10-08 NOTE — DISCHARGE INSTRUCTIONS
Please continue Tylenol 650 mg and Motrin 400 mg every 6 hours as needed for pain. You may apply lidocaine patch every 12 hours as needed for pain. Please continue to use incentive spirometer multiple times throughout the day to encourage taking deep breaths.

## 2023-10-08 NOTE — Clinical Note
Elisabeth Darden was seen and treated in our emergency department on 10/8/2023. Diagnosis:     Samuel Ordaz  may return to work on return date. He may return on this date: 10/09/2023         If you have any questions or concerns, please don't hesitate to call.       Calista Stinson MD    ______________________________           _______________          _______________  Hospital Representative                              Date                                Time

## 2023-10-09 LAB
ATRIAL RATE: 95 BPM
P AXIS: 44 DEGREES
PR INTERVAL: 124 MS
QRS AXIS: 24 DEGREES
QRSD INTERVAL: 92 MS
QT INTERVAL: 352 MS
QTC INTERVAL: 442 MS
T WAVE AXIS: 2 DEGREES
VENTRICULAR RATE: 95 BPM

## 2023-10-09 PROCEDURE — 93010 ELECTROCARDIOGRAM REPORT: CPT | Performed by: INTERNAL MEDICINE
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
## 2024-02-01 ENCOUNTER — OFFICE VISIT (OUTPATIENT)
Dept: SLEEP CENTER | Facility: CLINIC | Age: 24
End: 2024-02-01
Payer: COMMERCIAL

## 2024-02-01 VITALS
HEART RATE: 90 BPM | WEIGHT: 315 LBS | TEMPERATURE: 98.7 F | HEIGHT: 70 IN | RESPIRATION RATE: 16 BRPM | DIASTOLIC BLOOD PRESSURE: 88 MMHG | SYSTOLIC BLOOD PRESSURE: 118 MMHG | BODY MASS INDEX: 45.1 KG/M2 | OXYGEN SATURATION: 96 %

## 2024-02-01 DIAGNOSIS — F32.1 MODERATE MAJOR DEPRESSION, SINGLE EPISODE (HCC): ICD-10-CM

## 2024-02-01 DIAGNOSIS — F51.12 INSUFFICIENT SLEEP SYNDROME: ICD-10-CM

## 2024-02-01 DIAGNOSIS — F84.5 ASPERGER SYNDROME: ICD-10-CM

## 2024-02-01 DIAGNOSIS — Z71.89 CPAP USE COUNSELING: ICD-10-CM

## 2024-02-01 DIAGNOSIS — E66.01 MORBID OBESITY (HCC): ICD-10-CM

## 2024-02-01 DIAGNOSIS — G47.33 OSA (OBSTRUCTIVE SLEEP APNEA): Primary | ICD-10-CM

## 2024-02-01 DIAGNOSIS — F63.81 INTERMITTENT EXPLOSIVE DISORDER: ICD-10-CM

## 2024-02-01 DIAGNOSIS — K21.9 GERD WITHOUT ESOPHAGITIS: ICD-10-CM

## 2024-02-01 DIAGNOSIS — Z91.199 POOR COMPLIANCE WITH CPAP TREATMENT: ICD-10-CM

## 2024-02-01 PROCEDURE — 99214 OFFICE O/P EST MOD 30 MIN: CPT | Performed by: INTERNAL MEDICINE

## 2024-02-01 NOTE — PROGRESS NOTES
Follow-Up Note - [facility]   Dae Napier  23 y.o. male  :2000  MRN:[id number]  DOS:2024    CC: I saw this patient for follow-up in clinic today for Sleep disordered breathing, Coexisting Sleep and Medical Problems.  He was previously under care of Dr. Rojas and I reviewed clinic records.  He was last seen in 2021.  Interval changes: He discontinued use a year ago because he was unable to obtain supplies.    HST done 2021 which was diagnostic for ADELE: respiratory event index (NATHALY) of 42.8  The lowest SpO2 recorded is 79% and 3.6% of the study was spent with saturations below 90%. He is on APAP 6-16cm.     PFSH, Problem List, Medications & Allergies were reviewed in EMR.   He  has a past medical history of Asperger syndrome, Exogenous obesity, Hearing difficulty, Intermittent explosive disorder, Lyme disease, MTHFR mutation, and Psychiatric disorder.    He has a current medication list which includes the following prescription(s): duloxetine and acetaminophen.    PHYSIOLOGICAL DATA REVIEW : Discontinued use of PAP.;  For 30 days ending 2023, device was used for more than 4 hours 43% of the time.  Respiratory event index was 3/h@90th percentile pressure of 14.7 cm H2O patient has not been using non FDA approved devices to sanitize the machine.  INTERPRETATION: Compliance is poor; Pressure setting is:6-16]; ;   SUBJECTIVE: With respect to use of PAP, Dae  is experiencing no adverse effects:.He derives benefit.  Is satisfied with sleep and daytime function.   Sleep Routine: Dae reports getting 6 hrs sleep; he has no difficulty initiating or maintaining sleep . He arises needing an alarm and usually feels refreshed.Dae reports episodic daytime sleepiness, but is not napping.  He rated himself at Total score: 1 /24 on the [place] Sleepiness Scale.   Other issues: None reported.     Habits: Reports that he has never smoked. His smokeless tobacco use includes chew.,  " Reports current alcohol use.,  Reports no history of drug use., Caffeine use:limited; Exercise routine: none.      ROS: Significant for around 20 pounds weight gain since his last visit.  He is reporting no nasal, respiratory or cardiac symptoms.  Acid reflux is controlled.  He feels mood is stable and is no longer using duloxetine..    EXAM: /88   Pulse 90   Temp 98.7 °F (37.1 °C) (Temporal)   Resp 16   Ht 5' 10\" (1.778 m)   Wt (!) 153 kg (338 lb)   SpO2 96%   BMI 48.50 kg/m²     Wt Readings from Last 3 Encounters:   02/01/24 (!) 153 kg (338 lb)   11/19/21 (!) 137 kg (302 lb 9.6 oz)   10/18/21 (!) 141 kg (311 lb 6.4 oz)      Patient is well groomed; well appearing.   CNS: Alert, orientated, speech clear & coherent  Psych: cooperative and in no distress. Mental state: Appears normal.  H&N: EOMI; NC/AT: No facial pressure marks, no rashes.    Skin/Extrem: col & hydration normal; no edema  Resp: Respiratory effort is normal  Physical findings otherwise essentially unchanged from previous.    There are no recent labs for review but he has labs on order.    IMPRESSION: Problem List Items & Comorbidities Addressed this Visit    1. ADELE (obstructive sleep apnea)  PAP DME Resupply/Reorder      2. Poor compliance with CPAP treatment        3. CPAP use counseling        4. Insufficient sleep syndrome        5. Morbid obesity (HCC)        6. GERD without esophagitis        7. Asperger syndrome        8. Intermittent explosive disorder        9. Moderate major depression, single episode (HCC)            PLAN:  1. I reviewed results of prior studies and physiologic data with the patient.    2. Notified of [employer] devices recall and their recommendation to discontinue use.    3. Risks of discontinuing PAP vs continuing while awaiting resolution were explained and patient indicates understanding.     4. I discussed treatment options with risks and benefits.    5. Patient is aware of DOT regulations for drivers with " "CDL and elected to continue using Pap while awaiting remediation.     6. Instructed  to register machine with [employer] & track progress on [employer] website.    7. Care of equipment, methods to improve comfort using PAP and importance of compliance with therapy were discussed.     8. Instructed not to use ozone based or other unapproved  cleaning devices.    9. Pressure settin-16 cmH2O.     10. Rx provided to replace supplies and Care coordinated with DME provider.    11. Discussed strategies for weight reduction and allowing sufficient opportunity for sleep..     12. Follow-up is advised in 2 months to monitor progress, compliance and to adjust therapy.    Thank you for allowing me to participate in the care of this patient.    Sincerely,     Authenticated electronically on 24   Board Certified Specialist     Portions of the record may have been created with voice recognition software. Occasional wrong word or \"sound a like\" substitutions may have occurred due to the inherent limitations of voice recognition software. There may also be notations and random deletions of words or characters from malfunctioning software. Read the chart carefully and recognize, using context, where substitutions/deletions have occurred.    "

## 2024-02-01 NOTE — PATIENT INSTRUCTIONS

## 2024-02-02 ENCOUNTER — TELEPHONE (OUTPATIENT)
Dept: SLEEP CENTER | Facility: CLINIC | Age: 24
End: 2024-02-02

## 2024-02-02 LAB
DME PARACHUTE DELIVERY DATE REQUESTED: NORMAL
DME PARACHUTE ITEM DESCRIPTION: NORMAL
DME PARACHUTE ORDER STATUS: NORMAL
DME PARACHUTE SUPPLIER NAME: NORMAL
DME PARACHUTE SUPPLIER PHONE: NORMAL

## 2024-02-05 LAB

## 2024-03-26 ENCOUNTER — OFFICE VISIT (OUTPATIENT)
Dept: FAMILY MEDICINE CLINIC | Facility: CLINIC | Age: 24
End: 2024-03-26
Payer: COMMERCIAL

## 2024-03-26 VITALS
HEIGHT: 70 IN | BODY MASS INDEX: 45.1 KG/M2 | SYSTOLIC BLOOD PRESSURE: 146 MMHG | WEIGHT: 315 LBS | HEART RATE: 109 BPM | DIASTOLIC BLOOD PRESSURE: 98 MMHG | RESPIRATION RATE: 16 BRPM | OXYGEN SATURATION: 95 % | TEMPERATURE: 97.6 F

## 2024-03-26 DIAGNOSIS — Z13.89 SCREENING FOR MULTIPLE CONDITIONS: ICD-10-CM

## 2024-03-26 DIAGNOSIS — E66.01 CLASS 3 SEVERE OBESITY WITH BODY MASS INDEX (BMI) OF 45.0 TO 49.9 IN ADULT, UNSPECIFIED OBESITY TYPE, UNSPECIFIED WHETHER SERIOUS COMORBIDITY PRESENT (HCC): Primary | ICD-10-CM

## 2024-03-26 PROBLEM — G47.33 OSA (OBSTRUCTIVE SLEEP APNEA): Status: RESOLVED | Noted: 2024-02-01 | Resolved: 2024-03-26

## 2024-03-26 PROBLEM — E66.813 CLASS 3 SEVERE OBESITY WITH BODY MASS INDEX (BMI) OF 45.0 TO 49.9 IN ADULT (HCC): Status: ACTIVE | Noted: 2024-03-26

## 2024-03-26 PROCEDURE — 99213 OFFICE O/P EST LOW 20 MIN: CPT | Performed by: PHYSICIAN ASSISTANT

## 2024-03-26 NOTE — PROGRESS NOTES
Name: Dae Napier      : 2000      MRN: [id number]  Encounter Provider: Noa Rey PA-C  Encounter Date: 3/26/2024   Encounter department: [facility]    Assessment & Plan     1. Class 3 severe obesity with body mass index (BMI) of 45.0 to 49.9 in adult, unspecified obesity type, unspecified whether serious comorbidity present (HCC)  Assessment & Plan:  Pt would like to try weight loss medication. Advised that he calls his insurance to find out if he has any coverage and if so, for what medication. If pt has coverage, will call and let us know what medication is covered.        2. Screening for multiple conditions  Assessment & Plan:  Due for routine labs.    Orders:  -     CBC; Future  -     Comprehensive metabolic panel; Future  -     Lipid Panel with Direct LDL reflex; Future  -     TSH, 3rd generation with Free T4 reflex; Future        Depression Screening and Follow-up Plan: Patient was screened for depression during today's encounter. They screened negative with a PHQ-9 score of 0.    Tobacco Cessation Counseling: Tobacco cessation counseling was provided. The patient is sincerely urged to quit consumption of tobacco. He is not ready to quit tobacco.         Subjective      Dae is here today to discuss weight loss, would like to try weight loss medication.  Pt states in the past, tried decreasing calories to 2500 vicki per day, tried eliminating soda and decreasing carbohydrates. He also states that he went to the gym for 2 months, went 2-3 times weekly and used the treadmill and weight lifting. He states did lose 20-30 lbs but gained it all back.      Review of Systems   Constitutional:  Negative for chills and fever.   HENT:  Negative for ear pain and sore throat.    Eyes:  Negative for pain and visual disturbance.   Respiratory:  Negative for cough and shortness of breath.    Cardiovascular:  Negative for chest pain and palpitations.   Gastrointestinal:  Negative for  "abdominal pain and vomiting.   Genitourinary:  Negative for dysuria and hematuria.   Musculoskeletal:  Negative for arthralgias and back pain.   Skin:  Negative for color change and rash.   Neurological:  Negative for seizures and syncope.   All other systems reviewed and are negative.      Current Outpatient Medications on File Prior to Visit   Medication Sig   • [DISCONTINUED] acetaminophen (TYLENOL) 325 mg tablet Take 2 tablets by mouth every 6 (six) hours as needed (pain) (Patient not taking: Reported on 10/18/2021)   • [DISCONTINUED] DULoxetine (CYMBALTA) 30 mg delayed release capsule Take 1 capsule (30 mg total) by mouth daily (Patient not taking: Reported on 3/26/2024)       Objective     /98 (BP Location: Left arm, Patient Position: Sitting, Cuff Size: Adult)   Pulse (!) 109   Temp 97.6 °F (36.4 °C) (Temporal)   Resp 16   Ht 5' 10\" (1.778 m)   Wt (!) 155 kg (341 lb)   SpO2 95%   BMI 48.93 kg/m²     Physical Exam  Vitals reviewed.   Constitutional:       General: He is not in acute distress.     Appearance: He is well-developed. He is obese. He is not diaphoretic.   HENT:      Head: Normocephalic and atraumatic.      Right Ear: Hearing, tympanic membrane, ear canal and external ear normal.      Left Ear: Hearing, tympanic membrane, ear canal and external ear normal.      Nose: Nose normal.      Mouth/Throat:      Mouth: Mucous membranes are moist.      Pharynx: Oropharynx is clear. Uvula midline. No oropharyngeal exudate.   Eyes:      General: No scleral icterus.        Right eye: No discharge.         Left eye: No discharge.      Conjunctiva/sclera: Conjunctivae normal.   Neck:      Thyroid: No thyromegaly.      Vascular: No carotid bruit.   Cardiovascular:      Rate and Rhythm: Normal rate and regular rhythm.      Heart sounds: Normal heart sounds. No murmur heard.  Pulmonary:      Effort: Pulmonary effort is normal. No respiratory distress.      Breath sounds: Normal breath sounds. No " wheezing.   Abdominal:      General: Bowel sounds are normal. There is no distension.      Palpations: Abdomen is soft. There is no mass.      Tenderness: There is no abdominal tenderness. There is no guarding or rebound.   Musculoskeletal:         General: No tenderness. Normal range of motion.      Cervical back: Neck supple.   Lymphadenopathy:      Cervical: No cervical adenopathy.   Skin:     General: Skin is warm and dry.      Findings: No erythema or rash.   Neurological:      Mental Status: He is alert and oriented to person, place, and time.   Psychiatric:         Behavior: Behavior normal.         Thought Content: Thought content normal.         Judgment: Judgment normal.       Noa Rey PA-C

## 2024-03-26 NOTE — ASSESSMENT & PLAN NOTE
Pt would like to try weight loss medication. Advised that he calls his insurance to find out if he has any coverage and if so, for what medication. If pt has coverage, will call and let us know what medication is covered.

## 2024-04-02 ENCOUNTER — TELEPHONE (OUTPATIENT)
Dept: FAMILY MEDICINE CLINIC | Facility: CLINIC | Age: 24
End: 2024-04-02

## 2024-04-02 DIAGNOSIS — E66.01 CLASS 3 SEVERE OBESITY WITH BODY MASS INDEX (BMI) OF 45.0 TO 49.9 IN ADULT, UNSPECIFIED OBESITY TYPE, UNSPECIFIED WHETHER SERIOUS COMORBIDITY PRESENT (HCC): Primary | ICD-10-CM

## 2024-04-02 NOTE — TELEPHONE ENCOUNTER
Please let patient know that I sent Rx for Wegovy to pharmacy. He should call in 1 month when ready for refill.

## 2024-04-02 NOTE — TELEPHONE ENCOUNTER
Pt called and said that the pharmacy told him that they are waiting a PA     I told him this can take a couple days, we will send the information to his insurance to see if they will cover it.   Pt said oh no he called his insurance and was told that they will cover it, that the pharmacy told him all the Dr has to do is call and speak to the insurance.     I told him unfortunately that is not how it is done, that we have someone who sends  all the information to his insurance and then they review the information and let us know if it is approved and it can take several days. And that we will let him know.     He verbalized understanding

## 2024-04-04 ENCOUNTER — TELEPHONE (OUTPATIENT)
Dept: PAIN MEDICINE | Facility: CLINIC | Age: 24
End: 2024-04-04

## 2024-04-04 NOTE — TELEPHONE ENCOUNTER
Patient called, stated trying to get a hold of supply people for Masks for CPAP, gave message to Murphy to call him back since he can't get ahold of anyone

## 2024-04-12 ENCOUNTER — TELEPHONE (OUTPATIENT)
Dept: FAMILY MEDICINE CLINIC | Facility: CLINIC | Age: 24
End: 2024-04-12

## 2024-04-12 NOTE — TELEPHONE ENCOUNTER
Patient Instructions    Surgery scheduled at [facility] for C5-6 arthroplasty with Dr. Salvador    Pre-Operative    Surgical risks: blood clots in the leg or lung, problems urinating, nerve damage, drainage from the incision, infection, stiffness    You will need a Pre-operative physical with primary care physician within 30 days prior to your surgical date.     This is a same day procedure with discharge home day of surgery.    Smoking Cessation  You are advised to quit smoking immediately through recovery to help with healing and reduce risk of complications. Printout given.     Shower procedure  You will need to shower the night before and morning of surgery using the antimicrobial soap (chlorhexidine) given to you. Please refer to showering instruction sheet in surgery education folder.    Eating/Drinking  Stop all solid foods 8 hours before surgery.  Keep drinking clear liquids until 4 hours before surgery  Clear liquids include water, clear juice, black coffee, or clear tea without milk, Gatorade, clear soda.     Medications  Discontinue Aspirin & NSAIDs (Advil/Ibuprofen, Indocin, Naproxen,Nuprin,Relafen/Nabumetone, Diclofenac,Meloxicam, Aleve, Celebrex) 7 days prior to surgical date. After surgery, do not begin taking these medications until given clearance as it may cause bleeding and interfere with healing.  It is ok to take Tylenol (Acetaminophen) for pain within the 7 days prior to surgery. Example: you could take 1000 mg 3 times per day. Do not exceed 3,000 mg per day.   If you are on chronic pain medication (oxycodone, Percocet, hydrocodone, Vicodin, Norco, Dilaudid, morphine, MS Contin, naltrexone, Suboxone, etc) or have a pain contract we will reach out to your pain clinic to gather your most recent records and recommendations for pain management post-op.  Please ask your provider who manages your chronic pain if they require you to schedule an office visit prior to surgery.  Pharm will not auth WEGOVY for pt,even though we sent clinicals.  Pls advise   Continue obtaining your pain medications from your current provider until surgery. Our team will manage your acute post-op pain in the hospital and during the recovery period. Your pain team will continue to manage your chronic pain.   Any other medications prescribed, please discuss with your primary care provider at your pre-operative physical       Post-Operative    Incision Care  Look at your incision site every day. You  may need a mirror or family member to help you.   Watch for signs of infection  Redness, swelling, warmth, drainage (Green or yellow drainage (pus) from your incision or increased bloody drainage), and fever of 101 degrees or higher  [facility] [phone]  Remove dressing as instructed upon discharge  Do not apply lotions or ointments to incision  It is okay to shower, just pat the incision dry   No submerging incision in water such as pools, hot tubs, or baths for at least 8 weeks and until the incision is healed    Pain Management  Dealing with pain  As your body heals, you might feel a stabbing, burning, or aching pain. You may also have some numbness.  Everyone feels pain differently, we may ask you to rate your pain using a pain scale. This will let us know how much pain you feel.   Keep in mind that medicine won't take away all of your pain. It helps to try other ways to relax and ease pain.   Things to help with pain  After surgery, we will give you medicine for your pain. These medications work well, but they can make you drowsy, itchy, or sick to your stomach. If we give you narcotics for pain, try to take the pills with food.   For mild to moderate pain, you can take medication such as Tylenol. These can be used with narcotics, but make sure that your narcotic does not contain Tylenol.   Do NOT drive while taking narcotic pain medication  Do NOT drink alcohol while using any pain medication  You can utilize ice as needed (no longer than 20 minutes at one time)  Refills of pain  medication: please call the neurosurgery clinic to request 2-3 days before you run out  Aspirin & NSAIDS (ex. Ibuprofen, aleve, naproxen): Don't take NSAIDs until 2 weeks after surgery to reduce risk of bleeding and interference with bone healing     Bowel Care  Many people have constipation (hard stools) after surgery. The narcotic pain medication we often prescribed can contribute to constipation. To help prevent constipation: Drink plenty of fluid (8-10 glasses/day); Eat more fiber, such as whole grain bread, bran cereal, and fruits and vegetables; Stay active by walking; Over the counter stool softener may also help.      Activity Restrictions  For the first 6 weeks, no lifting > 10 pounds, limited bending, twisting, or overhead reaching.  Take stairs in moderation   Walking is the best way to start exercise after surgery. Take short frequent walks. You may gradually increase the distance as tolerated. If you feel pain, decrease your activity, but DO NOT stop walking. Walking will help you gain strength, prevent muscle soreness and spasms, and prevent blood clots.  Avoid bed rest and prolonged sitting for longer than 30 minutes (change positions frequently while awake)  No contact sports or high impact activities such as; running/jogging, snowmobile or 4 zavala riding or any other recreational vehicles until after given clearance at one of your follow up visits    Contact clinic right away or go to the Emergency Department if you develop:   Infection (incisional redness, swelling, warmth, drainage, or fever (temp > 101 F))  New injury  Bladder or bowel changes or loss of control    Signs of blood clot:  Swelling and/or warmth in one or both legs  Pain or tenderness in your leg, ankle, foot, or arm   Red or discolored skin     Go to the Emergency Department   If sudden onset of severe headache, weakness, confusion, change in level of consciousness, pain, or loss of movement.  Chest pain  Trouble breathing      Post-Op Follow Up Appointments  2 week incision check & staple/suture removal with a Neurosurgery Nurse  6 week and 3 month post-op visit with Physican Assistant or Nurse Practitioner with x-rays 30 minutes prior    Resources  If you are currently employed, you will need to be off work for 2-4 weeks for recovery and healing.  Please fax any FMLA/short term disability paperwork to [phone] prior to surgery  You may call our clinic when you'd like to return to work and we can provide a work letter  To allow staff adequate time to complete paperwork, please send as soon as possible     [facility]  [facility] - [address] [address]  Telephone:  [phone]       Fax:  [phone]

## 2024-04-12 NOTE — TELEPHONE ENCOUNTER
Spoke with pt and gave him info that his insurance would not cover his wegovy   Pt voiced understanding

## 2024-11-01 ENCOUNTER — TELEPHONE (OUTPATIENT)
Age: 24
End: 2024-11-01

## 2024-11-01 NOTE — TELEPHONE ENCOUNTER
Patient called stating he was on an anti depressant (Cymbalta 30mg) back when he was a patient of Dr. Zamudio. He states he would like to continue taking the medication and asks if PCP can prescribe and send to [id number] - [place] - [address] [place] [address] [phone]       Patient would appreciate a call back to notify id Rx was sent. Please advise. Thank you.

## 2024-11-18 ENCOUNTER — OFFICE VISIT (OUTPATIENT)
Dept: FAMILY MEDICINE CLINIC | Facility: CLINIC | Age: 24
End: 2024-11-18
Payer: COMMERCIAL

## 2024-11-18 VITALS
SYSTOLIC BLOOD PRESSURE: 110 MMHG | DIASTOLIC BLOOD PRESSURE: 98 MMHG | WEIGHT: 315 LBS | RESPIRATION RATE: 16 BRPM | HEIGHT: 70 IN | BODY MASS INDEX: 45.1 KG/M2 | OXYGEN SATURATION: 95 % | HEART RATE: 120 BPM | TEMPERATURE: 97.5 F

## 2024-11-18 DIAGNOSIS — J40 BRONCHITIS: ICD-10-CM

## 2024-11-18 DIAGNOSIS — F32.1 MODERATE MAJOR DEPRESSION, SINGLE EPISODE (HCC): Primary | ICD-10-CM

## 2024-11-18 PROCEDURE — 99214 OFFICE O/P EST MOD 30 MIN: CPT | Performed by: PHYSICIAN ASSISTANT

## 2024-11-18 RX ORDER — DULOXETIN HYDROCHLORIDE 30 MG/1
30 CAPSULE, DELAYED RELEASE ORAL DAILY
Qty: 30 CAPSULE | Refills: 0 | Status: SHIPPED | OUTPATIENT
Start: 2024-11-18

## 2024-11-18 RX ORDER — CLINDAMYCIN HYDROCHLORIDE 300 MG/1
300 CAPSULE ORAL 3 TIMES DAILY
Qty: 21 CAPSULE | Refills: 0 | Status: SHIPPED | OUTPATIENT
Start: 2024-11-18 | End: 2024-11-25

## 2024-11-18 NOTE — PROGRESS NOTES
Name: Dae Napier      : 2000      MRN: [id number]  Encounter Provider: Noa Rey PA-C  Encounter Date: 2024   Encounter department: [facility]  :  Assessment & Plan  Moderate major depression, single episode (HCC)  Depression Screening Follow-up Plan: Patient's depression screening was positive with a PHQ-9 score of 14.     Start duloxetine 30 mg daily, RTO 1 month or sooner PRN.    Orders:    DULoxetine (CYMBALTA) 30 mg delayed release capsule; Take 1 capsule (30 mg total) by mouth daily    Bronchitis  Given clindamycin, take until finished.    Orders:    clindamycin (CLEOCIN) 300 MG capsule; Take 1 capsule (300 mg total) by mouth 3 (three) times a day for 7 days          Depression Screening and Follow-up Plan: Patient's depression screening was positive with a PHQ-9 score of 14. Patient assessed for underlying major depression. Brief counseling provided and recommend additional follow-up/re-evaluation next office visit.       History of Present Illness     Pt here today requesting to restart duloxetine for depression. Used to take few years ago and worked well for him.  He also has had a cough x few weeks.      Review of Systems   Constitutional:  Negative for chills and fever.   HENT:  Negative for ear pain and sore throat.    Eyes:  Negative for pain and visual disturbance.   Respiratory:  Positive for cough. Negative for shortness of breath.    Cardiovascular:  Negative for chest pain and palpitations.   Gastrointestinal:  Negative for abdominal pain and vomiting.   Genitourinary:  Negative for dysuria and hematuria.   Musculoskeletal:  Negative for arthralgias and back pain.   Skin:  Negative for color change and rash.   Neurological:  Negative for seizures and syncope.   Psychiatric/Behavioral:  Positive for dysphoric mood.    All other systems reviewed and are negative.         Objective   /98 (BP Location: Left arm, Patient Position: Sitting, Cuff Size: Standard)  "  Pulse (!) 120   Temp 97.5 °F (36.4 °C) (Temporal)   Resp 16   Ht 5' 10\" (1.778 m)   Wt (!) 151 kg (333 lb)   SpO2 95%   BMI 47.78 kg/m²      Physical Exam  Vitals reviewed.   Constitutional:       General: He is not in acute distress.     Appearance: He is well-developed. He is not diaphoretic.   HENT:      Head: Normocephalic and atraumatic.      Right Ear: Hearing, tympanic membrane, ear canal and external ear normal.      Left Ear: Hearing, tympanic membrane, ear canal and external ear normal.      Nose: Nose normal.      Mouth/Throat:      Mouth: Mucous membranes are moist.      Pharynx: Oropharynx is clear. Uvula midline. No oropharyngeal exudate.   Eyes:      General: No scleral icterus.        Right eye: No discharge.         Left eye: No discharge.      Conjunctiva/sclera: Conjunctivae normal.   Neck:      Thyroid: No thyromegaly.      Vascular: No carotid bruit.   Cardiovascular:      Rate and Rhythm: Normal rate and regular rhythm.      Heart sounds: Normal heart sounds. No murmur heard.  Pulmonary:      Effort: Pulmonary effort is normal. No respiratory distress.      Breath sounds: Wheezing present.   Abdominal:      General: Bowel sounds are normal. There is no distension.      Palpations: Abdomen is soft. There is no mass.      Tenderness: There is no abdominal tenderness. There is no guarding or rebound.   Musculoskeletal:         General: No tenderness. Normal range of motion.      Cervical back: Neck supple.   Lymphadenopathy:      Cervical: No cervical adenopathy.   Skin:     General: Skin is warm and dry.      Findings: No erythema or rash.   Neurological:      Mental Status: He is alert and oriented to person, place, and time.   Psychiatric:         Mood and Affect: Mood is depressed.         Behavior: Behavior normal.         Thought Content: Thought content normal. Thought content does not include suicidal ideation. Thought content does not include suicidal plan.         Judgment: " Judgment normal.

## 2024-11-18 NOTE — ASSESSMENT & PLAN NOTE
Depression Screening Follow-up Plan: Patient's depression screening was positive with a PHQ-9 score of 14.     Start duloxetine 30 mg daily, RTO 1 month or sooner PRN.    Orders:    DULoxetine (CYMBALTA) 30 mg delayed release capsule; Take 1 capsule (30 mg total) by mouth daily

## 2025-01-06 ENCOUNTER — OFFICE VISIT (OUTPATIENT)
Dept: FAMILY MEDICINE CLINIC | Facility: CLINIC | Age: 25
End: 2025-01-06
Payer: COMMERCIAL

## 2025-01-06 VITALS
DIASTOLIC BLOOD PRESSURE: 82 MMHG | WEIGHT: 315 LBS | RESPIRATION RATE: 18 BRPM | SYSTOLIC BLOOD PRESSURE: 130 MMHG | HEART RATE: 116 BPM | BODY MASS INDEX: 45.1 KG/M2 | TEMPERATURE: 99.5 F | OXYGEN SATURATION: 96 % | HEIGHT: 70 IN

## 2025-01-06 DIAGNOSIS — F32.1 MODERATE MAJOR DEPRESSION, SINGLE EPISODE (HCC): Primary | ICD-10-CM

## 2025-01-06 DIAGNOSIS — Z00.00 ANNUAL PHYSICAL EXAM: ICD-10-CM

## 2025-01-06 DIAGNOSIS — F84.5 ASPERGER SYNDROME: ICD-10-CM

## 2025-01-06 PROCEDURE — 99213 OFFICE O/P EST LOW 20 MIN: CPT | Performed by: PHYSICIAN ASSISTANT

## 2025-01-06 PROCEDURE — 99395 PREV VISIT EST AGE 18-39: CPT | Performed by: PHYSICIAN ASSISTANT

## 2025-01-06 RX ORDER — DULOXETIN HYDROCHLORIDE 60 MG/1
60 CAPSULE, DELAYED RELEASE ORAL DAILY
Qty: 30 CAPSULE | Refills: 0 | Status: SHIPPED | OUTPATIENT
Start: 2025-01-06

## 2025-01-06 NOTE — PROGRESS NOTES
"Adult Annual Physical  Name: Dae Napier      : 2000      MRN: [id number]  Encounter Provider: Noa Rey PA-C  Encounter Date: 2025   Encounter department: [facility]    Assessment & Plan  Moderate major depression, single episode (HCC)    Orders:  •  DULoxetine (CYMBALTA) 60 mg delayed release capsule; Take 1 capsule (60 mg total) by mouth daily    Asperger syndrome         Annual physical exam           Immunizations and preventive care screenings were discussed with patient today. Appropriate education was printed on patient's after visit summary.          Depression Screening and Follow-up Plan: Patient was screened for depression during today's encounter. They screened negative with a PHQ-9 score of 4.    Tobacco Cessation Counseling: Tobacco cessation counseling was provided. The patient is sincerely urged to quit consumption of tobacco. He is not ready to quit tobacco.         History of Present Illness     Adult Annual Physical:  Patient presents for annual physical. Dae is here today for follow up. Has only been consistently taking Cymbalta the past 2 weeks, feels improvement in his MDD, would like to try increased dose. He is also interested in seeing psychiatry to \"see if there is any other diagnoses\" and is interested in talk therapy..     Depression Screening:    - PHQ-9 Score: 4    General Health:    - Hearing: normal hearing right ear and normal hearing left ear.    Review of Systems   Constitutional:  Negative for chills and fever.   HENT:  Negative for ear pain and sore throat.    Eyes:  Negative for pain and visual disturbance.   Respiratory:  Negative for cough and shortness of breath.    Cardiovascular:  Negative for chest pain and palpitations.   Gastrointestinal:  Negative for abdominal pain and vomiting.   Genitourinary:  Negative for dysuria and hematuria.   Musculoskeletal:  Negative for arthralgias and back pain.   Skin:  Negative for color change " "and rash.   Neurological:  Negative for seizures and syncope.   All other systems reviewed and are negative.        Objective   /82 (BP Location: Left arm, Patient Position: Sitting, Cuff Size: Standard)   Pulse (!) 116   Temp 99.5 °F (37.5 °C) (Temporal)   Resp 18   Ht 5' 10\" (1.778 m)   Wt (!) 151 kg (332 lb)   SpO2 96%   BMI 47.64 kg/m²     Physical Exam  Vitals reviewed.   Constitutional:       General: He is not in acute distress.     Appearance: He is well-developed. He is not diaphoretic.   HENT:      Head: Normocephalic and atraumatic.      Right Ear: Hearing, tympanic membrane, ear canal and external ear normal.      Left Ear: Hearing, tympanic membrane, ear canal and external ear normal.      Nose: Nose normal.      Mouth/Throat:      Mouth: Mucous membranes are moist.      Pharynx: Oropharynx is clear. Uvula midline. No oropharyngeal exudate.   Eyes:      General: No scleral icterus.        Right eye: No discharge.         Left eye: No discharge.      Conjunctiva/sclera: Conjunctivae normal.   Neck:      Thyroid: No thyromegaly.      Vascular: No carotid bruit.   Cardiovascular:      Rate and Rhythm: Normal rate and regular rhythm.      Heart sounds: Normal heart sounds. No murmur heard.  Pulmonary:      Effort: Pulmonary effort is normal. No respiratory distress.      Breath sounds: Normal breath sounds. No wheezing.   Abdominal:      General: Bowel sounds are normal. There is no distension.      Palpations: Abdomen is soft. There is no mass.      Tenderness: There is no abdominal tenderness. There is no guarding or rebound.   Musculoskeletal:         General: No tenderness. Normal range of motion.      Cervical back: Neck supple.   Lymphadenopathy:      Cervical: No cervical adenopathy.   Skin:     General: Skin is warm and dry.      Findings: No erythema or rash.   Neurological:      Mental Status: He is alert and oriented to person, place, and time.   Psychiatric:         Mood and Affect: " Mood is depressed.         Behavior: Behavior normal.         Thought Content: Thought content normal. Thought content does not include homicidal or suicidal ideation. Thought content does not include homicidal or suicidal plan.         Judgment: Judgment normal.

## 2025-01-06 NOTE — ASSESSMENT & PLAN NOTE
Orders:  •  DULoxetine (CYMBALTA) 60 mg delayed release capsule; Take 1 capsule (60 mg total) by mouth daily

## 2025-01-06 NOTE — PATIENT INSTRUCTIONS
"Patient Education     Routine physical for adults   The Basics   Written by the doctors and editors at [facility]   What is a physical? -- A physical is a routine visit, or \"check-up,\" with your doctor. You might also hear it called a \"wellness visit\" or \"preventive visit.\"  During each visit, the doctor will:   Ask about your physical and mental health   Ask about your habits, behaviors, and lifestyle   Do an exam   Give you vaccines if needed   Talk to you about any medicines you take   Give advice about your health   Answer your questions  Getting regular check-ups is an important part of taking care of your health. It can help your doctor find and treat any problems you have. But it's also important for preventing health problems.  A routine physical is different from a \"sick visit.\" A sick visit is when you see a doctor because of a health concern or problem. Since physicals are scheduled ahead of time, you can think about what you want to ask the doctor.  How often should I get a physical? -- It depends on your age and health. In general, for people age 21 years and older:   If you are younger than 50 years, you might be able to get a physical every 3 years.   If you are 50 years or older, your doctor might recommend a physical every year.  If you have an ongoing health condition, like diabetes or high blood pressure, your doctor will probably want to see you more often.  What happens during a physical? -- In general, each visit will include:   Physical exam - The doctor or nurse will check your height, weight, heart rate, and blood pressure. They will also look at your eyes and ears. They will ask about how you are feeling and whether you have any symptoms that bother you.   Medicines - It's a good idea to bring a list of all the medicines you take to each doctor visit. Your doctor will talk to you about your medicines and answer any questions. Tell them if you are having any side effects that bother you. You " "should also tell them if you are having trouble paying for any of your medicines.   Habits and behaviors - This includes:   Your diet   Your exercise habits   Whether you smoke, drink alcohol, or use drugs   Whether you are sexually active   Whether you feel safe at home  Your doctor will talk to you about things you can do to improve your health and lower your risk of health problems. They will also offer help and support. For example, if you want to quit smoking, they can give you advice and might prescribe medicines. If you want to improve your diet or get more physical activity, they can help you with this, too.   Lab tests, if needed - The tests you get will depend on your age and situation. For example, your doctor might want to check your:   Cholesterol   Blood sugar   Iron level   Vaccines - The recommended vaccines will depend on your age, health, and what vaccines you already had. Vaccines are very important because they can prevent certain serious or deadly infections.   Discussion of screening - \"Screening\" means checking for diseases or other health problems before they cause symptoms. Your doctor can recommend screening based on your age, risk, and preferences. This might include tests to check for:   Cancer, such as breast, prostate, cervical, ovarian, colorectal, prostate, lung, or skin cancer   Sexually transmitted infections, such as chlamydia and gonorrhea   Mental health conditions like depression and anxiety  Your doctor will talk to you about the different types of screening tests. They can help you decide which screenings to have. They can also explain what the results might mean.   Answering questions - The physical is a good time to ask the doctor or nurse questions about your health. If needed, they can refer you to other doctors or specialists, too.  Adults older than 65 years often need other care, too. As you get older, your doctor will talk to you about:   How to prevent falling at " home   Hearing or vision tests   Memory testing   How to take your medicines safely   Making sure that you have the help and support you need at home  All topics are updated as new evidence becomes available and our peer review process is complete.  This topic retrieved from [employer] on: May 02, 2024.  Topic 245309 Version 1.0  Release: 32.4.3 - C32.122  © 2024 UpToDate, Inc. and/or its affiliates. All rights reserved.  Consumer Information Use and Disclaimer   Disclaimer: This generalized information is a limited summary of diagnosis, treatment, and/or medication information. It is not meant to be comprehensive and should be used as a tool to help the user understand and/or assess potential diagnostic and treatment options. It does NOT include all information about conditions, treatments, medications, side effects, or risks that may apply to a specific patient. It is not intended to be medical advice or a substitute for the medical advice, diagnosis, or treatment of a health care provider based on the health care provider's examination and assessment of a patient's specific and unique circumstances. Patients must speak with a health care provider for complete information about their health, medical questions, and treatment options, including any risks or benefits regarding use of medications. This information does not endorse any treatments or medications as safe, effective, or approved for treating a specific patient. UpToDate, Inc. and its affiliates disclaim any warranty or liability relating to this information or the use thereof.The use of this information is governed by the Terms of Use, available at https://www.woltersFippexuwer.com/en/know/clinical-effectiveness-terms. 2024© UpToDate, Inc. and its affiliates and/or licensors. All rights reserved.  Copyright   © 2024 UpToDate, Inc. and/or its affiliates. All rights reserved.

## 2025-02-03 ENCOUNTER — OFFICE VISIT (OUTPATIENT)
Dept: FAMILY MEDICINE CLINIC | Facility: CLINIC | Age: 25
End: 2025-02-03
Payer: COMMERCIAL

## 2025-02-03 VITALS
BODY MASS INDEX: 45.1 KG/M2 | DIASTOLIC BLOOD PRESSURE: 84 MMHG | WEIGHT: 315 LBS | HEART RATE: 106 BPM | TEMPERATURE: 98 F | RESPIRATION RATE: 17 BRPM | OXYGEN SATURATION: 96 % | HEIGHT: 70 IN | SYSTOLIC BLOOD PRESSURE: 128 MMHG

## 2025-02-03 DIAGNOSIS — F32.1 MODERATE MAJOR DEPRESSION, SINGLE EPISODE (HCC): ICD-10-CM

## 2025-02-03 PROCEDURE — 99213 OFFICE O/P EST LOW 20 MIN: CPT | Performed by: PHYSICIAN ASSISTANT

## 2025-02-03 RX ORDER — DULOXETIN HYDROCHLORIDE 60 MG/1
60 CAPSULE, DELAYED RELEASE ORAL DAILY
Qty: 30 CAPSULE | Refills: 0 | Status: SHIPPED | OUTPATIENT
Start: 2025-02-03

## 2025-02-03 NOTE — PROGRESS NOTES
"Name: Dae Napier      : 2000      MRN: [id number]  Encounter Provider: Noa Rey PA-C  Encounter Date: 2/3/2025   Encounter department: [facility]  :  Assessment & Plan  Moderate major depression, single episode (HCC)  Improved, continue same. Pt has appointment tomorrow with psychiatry.  Orders:  •  DULoxetine (CYMBALTA) 60 mg delayed release capsule; Take 1 capsule (60 mg total) by mouth daily          Depression Screening and Follow-up Plan: Patient was screened for depression during today's encounter. They screened negative with a PHQ-9 score of 0.      History of Present Illness   Dae returns today for 1 month follow up for depression. Feels that Cymbalta has helped, feels 50% improvement. Feels that dose is a good dose for him, does not have any side effects. He is seeing psychiatry tomorrow.      Review of Systems   Constitutional:  Negative for chills and fever.   HENT:  Negative for ear pain and sore throat.    Eyes:  Negative for pain and visual disturbance.   Respiratory:  Negative for cough and shortness of breath.    Cardiovascular:  Negative for chest pain and palpitations.   Gastrointestinal:  Negative for abdominal pain and vomiting.   Genitourinary:  Negative for dysuria and hematuria.   Musculoskeletal:  Negative for arthralgias and back pain.   Skin:  Negative for color change and rash.   Neurological:  Negative for seizures and syncope.   All other systems reviewed and are negative.      Objective   /84 (BP Location: Left arm, Patient Position: Sitting, Cuff Size: Large)   Pulse (!) 106   Temp 98 °F (36.7 °C) (Temporal)   Resp 17   Ht 5' 10\" (1.778 m)   Wt (!) 154 kg (339 lb)   SpO2 96%   BMI 48.64 kg/m²      Physical Exam  Vitals reviewed.   Constitutional:       General: He is not in acute distress.     Appearance: He is well-developed. He is not diaphoretic.   HENT:      Head: Normocephalic and atraumatic.      Right Ear: Hearing, tympanic " membrane, ear canal and external ear normal.      Left Ear: Hearing, tympanic membrane, ear canal and external ear normal.      Nose: Nose normal.      Mouth/Throat:      Mouth: Mucous membranes are moist.      Pharynx: Oropharynx is clear. Uvula midline. No oropharyngeal exudate.   Eyes:      General: No scleral icterus.        Right eye: No discharge.         Left eye: No discharge.      Conjunctiva/sclera: Conjunctivae normal.   Neck:      Thyroid: No thyromegaly.      Vascular: No carotid bruit.   Cardiovascular:      Rate and Rhythm: Normal rate and regular rhythm.      Heart sounds: Normal heart sounds. No murmur heard.  Pulmonary:      Effort: Pulmonary effort is normal. No respiratory distress.      Breath sounds: Normal breath sounds. No wheezing.   Abdominal:      General: Bowel sounds are normal. There is no distension.      Palpations: Abdomen is soft. There is no mass.      Tenderness: There is no abdominal tenderness. There is no guarding or rebound.   Musculoskeletal:         General: No tenderness. Normal range of motion.      Cervical back: Neck supple.   Lymphadenopathy:      Cervical: No cervical adenopathy.   Skin:     General: Skin is warm and dry.      Findings: No erythema or rash.   Neurological:      Mental Status: He is alert and oriented to person, place, and time.   Psychiatric:         Behavior: Behavior normal.         Thought Content: Thought content normal.         Judgment: Judgment normal.

## 2025-02-03 NOTE — ASSESSMENT & PLAN NOTE
Improved, continue same. Pt has appointment tomorrow with psychiatry.  Orders:  •  DULoxetine (CYMBALTA) 60 mg delayed release capsule; Take 1 capsule (60 mg total) by mouth daily

## 2025-02-04 ENCOUNTER — OFFICE VISIT (OUTPATIENT)
Age: 25
End: 2025-02-04

## 2025-02-04 DIAGNOSIS — F34.1 PERSISTENT DEPRESSIVE DISORDER: Primary | ICD-10-CM

## 2025-02-04 PROCEDURE — PBNCHG PB NO CHARGE PLACEHOLDER
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
## 2025-02-05 NOTE — ASSESSMENT & PLAN NOTE
Cymbalta 60mg QD  PARQ completed including serotonin syndrome, SIADH, worsened depression/suicidality, induction of karon, blood pressure changes and GI distress, weight gain, sexual side effects, insomnia, sedation, potential for drug interactions, and others.

## 2025-02-20 ENCOUNTER — TELEPHONE (OUTPATIENT)
Age: 25
End: 2025-02-20

## 2025-03-19 ENCOUNTER — TELEPHONE (OUTPATIENT)
Age: 25
End: 2025-03-19

## 2025-03-19 ENCOUNTER — NURSE TRIAGE (OUTPATIENT)
Age: 25
End: 2025-03-19

## 2025-03-19 NOTE — TELEPHONE ENCOUNTER
Pt called for an appointment. Pt c/o lump right side of throat, hurts to swallow and fatigue x 2 weeks that is getting worse.  Pt requesting an appointment with any provider who has an opening.    No appointments available with PCP or any provider in the office and CTS spoke with office clerical who stated patient no appointments available.    Pt asking if he should be seen at ED. A warm transfer to nurse triage for further assistance was done

## 2025-03-19 NOTE — TELEPHONE ENCOUNTER
"FOLLOW UP: n/a    REASON FOR CONVERSATION: [place]    SYMPTOMS: Right neck lump size of a nickel that was noticed 3 days ago, painful to swallow x 2 weeks and non-stop tiredness x 2 months    OTHER: patient was a warm transfer by MA. No appointments available for today or tomorrow. Denies fever and states that his kids have been going through the stomach bug but he has not had their symptoms. Patient was advise getting evaluated in the Urgent care as he has been experiencing pain w/ swallowing. If he felt like he needed further imaging then the ED would be more advisable. Patient currently working but stated after work he would probably go to the ED.     DISPOSITION: Go to ED/UCC Now (overriding See Today in Office)        Reason for Disposition   Swelling is painful to touch and no fever    Answer Assessment - Initial Assessment Questions  1. APPEARANCE of SWELLING: \"What does it look like?\"      A lump of the right side of the neck size of a nickel     2. SIZE: \"How large is the swelling?\" (e.g., inches, cm; or compare to size of pinhead, tip of pen, eraser, coin, pea, grape, ping pong ball)       Size of a nickel     3. LOCATION: \"Where is the swelling located?\"      Right side of the neck   4. ONSET: \"When did the swelling start?\"    He noticed it 3 days ago    6. PAIN: \"Is there any pain?\" If Yes, ask: \"How bad is the pain?\" (Scale 1-10; or mild, moderate, severe)        Painful to swallow   7. ITCH: \"Does it itch?\" If Yes, ask: \"How bad is the itch?\"       Denies   8. CAUSE: \"What do you think caused the swelling?\"      Unsure   9 OTHER SYMPTOMS: \"Do you have any other symptoms?\" (e.g., fever)      Pain when swallowing and experiencing non stop tiredness for 2 months    Protocols used: Skin Lump or Localized Swelling-Adult-[place]    "

## 2025-05-27 NOTE — TELEPHONE ENCOUNTER
Kimberly Barone
Spoke to [employer]  They will approve an auto CPAP machine for him  For his sleep apnea  I printed a prescription for it 
Never smoker